# Patient Record
Sex: MALE | Race: WHITE | NOT HISPANIC OR LATINO | Employment: OTHER | URBAN - METROPOLITAN AREA
[De-identification: names, ages, dates, MRNs, and addresses within clinical notes are randomized per-mention and may not be internally consistent; named-entity substitution may affect disease eponyms.]

---

## 2017-05-01 ENCOUNTER — PRE VISIT (OUTPATIENT)
Dept: UROLOGY | Facility: CLINIC | Age: 37
End: 2017-05-01

## 2017-06-05 ENCOUNTER — PRE VISIT (OUTPATIENT)
Dept: UROLOGY | Facility: CLINIC | Age: 37
End: 2017-06-05

## 2017-06-12 ENCOUNTER — OFFICE VISIT (OUTPATIENT)
Dept: UROLOGY | Facility: CLINIC | Age: 37
End: 2017-06-12

## 2017-06-12 VITALS
BODY MASS INDEX: 34.27 KG/M2 | HEIGHT: 71 IN | WEIGHT: 244.8 LBS | DIASTOLIC BLOOD PRESSURE: 84 MMHG | SYSTOLIC BLOOD PRESSURE: 122 MMHG | HEART RATE: 63 BPM

## 2017-06-12 DIAGNOSIS — N31.9 NEUROGENIC BLADDER: Primary | ICD-10-CM

## 2017-06-12 DIAGNOSIS — G61.0 ACUTE INFECTIVE POLYNEURITIS (H): ICD-10-CM

## 2017-06-12 PROBLEM — L84 CORN: Status: ACTIVE | Noted: 2017-04-24

## 2017-06-12 PROBLEM — T17.308A CHOKING: Status: ACTIVE | Noted: 2017-02-02

## 2017-06-12 PROBLEM — R13.10 DYSPHAGIA: Status: ACTIVE | Noted: 2017-04-24

## 2017-06-12 RX ORDER — TRAMADOL HYDROCHLORIDE 50 MG/1
50-100 TABLET ORAL
COMMUNITY
Start: 2017-04-13 | End: 2018-10-31

## 2017-06-12 RX ORDER — TAMSULOSIN HYDROCHLORIDE 0.4 MG/1
0.4 CAPSULE ORAL DAILY
COMMUNITY
Start: 2016-08-04

## 2017-06-12 RX ORDER — FLUOXETINE 40 MG/1
40 CAPSULE ORAL DAILY
COMMUNITY
Start: 2017-02-02

## 2017-06-12 RX ORDER — ALPRAZOLAM 0.5 MG
0.5 TABLET ORAL DAILY PRN
COMMUNITY
Start: 2017-02-02

## 2017-06-12 RX ORDER — POLYETHYLENE GLYCOL 3350 17 G/17G
POWDER, FOR SOLUTION ORAL
COMMUNITY
Start: 2016-11-03

## 2017-06-12 RX ORDER — NAPROXEN SODIUM 220 MG
220 TABLET ORAL DAILY
COMMUNITY

## 2017-06-12 RX ORDER — NIFEDIPINE 30 MG/1
30 TABLET, EXTENDED RELEASE ORAL DAILY
COMMUNITY
Start: 2017-04-13

## 2017-06-12 ASSESSMENT — ENCOUNTER SYMPTOMS
TREMORS: 0
DEPRESSION: 1
SPEECH CHANGE: 0
INSOMNIA: 0
SEIZURES: 0
DIFFICULTY URINATING: 1
LOSS OF CONSCIOUSNESS: 0
NERVOUS/ANXIOUS: 1
MUSCLE WEAKNESS: 1
HEMATURIA: 0
MEMORY LOSS: 0
DYSURIA: 0
DISTURBANCES IN COORDINATION: 0
TINGLING: 1
STIFFNESS: 0
BACK PAIN: 0
PARALYSIS: 0
MYALGIAS: 1
PANIC: 0
NUMBNESS: 1
ARTHRALGIAS: 0
HEADACHES: 0
MUSCLE CRAMPS: 1
NECK PAIN: 0
WEAKNESS: 1
DECREASED CONCENTRATION: 0
FLANK PAIN: 0
DIZZINESS: 1
JOINT SWELLING: 0

## 2017-06-12 ASSESSMENT — PAIN SCALES - GENERAL: PAINLEVEL: NO PAIN (0)

## 2017-06-12 NOTE — LETTER
6/12/2017       RE: Alcides Velazquez  [address]  [place] [address]     Dear Colleague,    Thank you for referring your patient, Alcides Velazquez, to the [facility] UROLOGY AND INST FOR PROSTATE AND UROLOGIC CANCERS at [facility]. Please see a copy of my visit note below.      Name: Alcides Velazquez    MRN: [id number]   YOB: 1980                 Chief Complaint:   Urinary urgency/frequency          History of Present Illness:   Mr. 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 He is referred by Dr. Ismael Yan. He developed urinary retention during the acute phase of his illness and performed CIC for several months. He had return of some of his voiding function 6 months ago but now is experiencing urinary frequency and urgency, as well as occasional incomplete emptying.     He underwent UDS in 3/2017 with Dr. Mariah Giraldo in [place]; findings included first sensation at 141 cc, urge at 180 cc, and cystometric capacity at 185 cc. There was strong DO and voiding pressures reached a PDet of 75 cm H2O. There was no DSD observed. Qmax was 6.4 cc/s, Qave 1.6 cc/s, and his PVR was 105 cc. He has been managed with several anticholinergics in an effort to treat his urgency, including Detrol, Ditropan, and Vesicare. More recently (6 weeks ago), he underwent bladder Botox injections (do not know dose) but has not noted any improvement in his symptoms. Cystoscopy showed a small, non-obstructing prostate.     He denies urinary tract infections. He is not currently performing CIC. He is on tamsulosin (two 0.4 mg qhs tablets daily). He has never tried mirabegron. He was started on bethanechol briefly but stopped this due to side effects and poor effect.          Past Medical History:   GBS  HTN  Dysphagia  Anxiety         Past Surgical History:     Tendon surgery (thumb)         Social  "History:     Social History   Substance Use Topics     Smoking status: Never Smoker     Smokeless tobacco: Not on file     Alcohol use No            Family History:     Family History   Problem Relation Age of Onset     DIABETES Father      Hypertension No family hx of             Allergies:   No Known Allergies         Medications:     Current Outpatient Prescriptions   Medication Sig     ALPRAZolam (XANAX) 0.5 MG tablet Take 0.5 mg by mouth daily as needed     FLUoxetine (PROZAC) 40 MG capsule Take 40 mg by mouth daily     NIFEdipine ER osmotic (PROCARDIA XL) 30 MG 24 hr tablet Take 30 mg by mouth daily     polyethylene glycol (MIRALAX/GLYCOLAX) powder MIX 17 GRAMS (1 CAPFUL) IN LIQUID AND DRINK TWICE DAILY     tamsulosin (FLOMAX) 0.4 MG capsule Take 0.4 mg by mouth daily     traMADol (ULTRAM) 50 MG tablet Take  mg by mouth nightly as needed     melatonin 5 MG CAPS Take 5 mg by mouth daily     naproxen sodium (ANAPROX) 220 MG tablet Take 220 mg by mouth daily           Review of Systems:    ROS: 14 point ROS neg other than the symptoms noted above in the HPI and PMH.          Physical Exam:   B/P: 122/84, T: Data Unavailable, P: 63, R: Data Unavailable  Estimated body mass index is 34.14 kg/(m^2) as calculated from the following:    Height as of this encounter: 1.803 m (5' 11\").    Weight as of this encounter: 111 kg (244 lb 12.8 oz).  No acute distress, not in wheelchair  Unlabored breathing  Abdomen soft nt/nd  Circ, orthotopic meatus, no penile plaques  Bilateral testicles of normal consistency and firmness           Assessment and Plan:   36 year old gentleman recovering from GBS, with urinary urgency and frequency refractory to anticholinergics. Recently tried bladder Botox with minimal effect. There may be a component of bladder neck dysfunction to his symptoms, and options include TUIP versus adding mirabegron. Additionally we could try higher doses of anti-cholinergics versus higher doses of bladder " Botox.      Videourodynamics to evaluate for bladder neck dysfunction  He had high pressure, low flow voiding pattern during his last UDS. Goal with VUDS is to assess the voiding phase and to see if the bladder neck opens during the voiding phase, and where the contrast stops during voids. Lilliana should call me (Helio) after the test to review results and then I can call the patient so he can skip another clinic visit.     Seen and examined with Dr. Cadet.    Preston Thorpe MD, PGY-4  June 12, 2017     Delayed entry of note due to Epic downtime. 1539  =======================================================  As the attending surgeon I, Chito Cadet, interviewed and examined the patient. The plan was developed between me and the patient. My findings and plan are as stated by the resident.    Chito Cadet MD

## 2017-06-12 NOTE — NURSING NOTE
"Chief Complaint   Patient presents with     Consult     hypospadias       Blood pressure 122/84, pulse 63, height 1.803 m (5' 11\"), weight 111 kg (244 lb 12.8 oz). Body mass index is 34.14 kg/(m^2).    Patient Active Problem List   Diagnosis     Anxiety     Choking     Constipation     Corn     Difficulty in urination     Dysphagia     Essential hypertension     Guillain-[place] syndrome (H)     Class 1 obesity     Low back pain with sciatica     Snoring     Syncope     Syncope and collapse     Retention of urine     Vasovagal syncope       No Known Allergies    Current Outpatient Prescriptions   Medication Sig Dispense Refill     ALPRAZolam (XANAX) 0.5 MG tablet Take 0.5 mg by mouth daily as needed       FLUoxetine (PROZAC) 40 MG capsule Take 40 mg by mouth daily       NIFEdipine ER osmotic (PROCARDIA XL) 30 MG 24 hr tablet Take 30 mg by mouth daily       polyethylene glycol (MIRALAX/GLYCOLAX) powder MIX 17 GRAMS (1 CAPFUL) IN LIQUID AND DRINK TWICE DAILY       tamsulosin (FLOMAX) 0.4 MG capsule Take 0.4 mg by mouth daily       traMADol (ULTRAM) 50 MG tablet Take  mg by mouth nightly as needed       melatonin 5 MG CAPS Take 5 mg by mouth daily       naproxen sodium (ANAPROX) 220 MG tablet Take 220 mg by mouth daily         Social History   Substance Use Topics     Smoking status: Never Smoker     Smokeless tobacco: Not on file     Alcohol use No       Dayna Hopkins LPN  6/12/2017  2:28 PM    "

## 2017-06-12 NOTE — PROGRESS NOTES
Name: Alcides Velazquez    MRN: [id number]   YOB: 1980                 Chief Complaint:   Urinary urgency/frequency          History of Present Illness:   Mr. 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 He is referred by Dr. Ismael Yan. He developed urinary retention during the acute phase of his illness and performed CIC for several months. He had return of some of his voiding function 6 months ago but now is experiencing urinary frequency and urgency, as well as occasional incomplete emptying.     He underwent UDS in 3/2017 with Dr. Mariah Giraldo in [place]; findings included first sensation at 141 cc, urge at 180 cc, and cystometric capacity at 185 cc. There was strong DO and voiding pressures reached a PDet of 75 cm H2O. There was no DSD observed. Qmax was 6.4 cc/s, Qave 1.6 cc/s, and his PVR was 105 cc. He has been managed with several anticholinergics in an effort to treat his urgency, including Detrol, Ditropan, and Vesicare. More recently (6 weeks ago), he underwent bladder Botox injections (do not know dose) but has not noted any improvement in his symptoms. Cystoscopy showed a small, non-obstructing prostate.     He denies urinary tract infections. He is not currently performing CIC. He is on tamsulosin (two 0.4 mg qhs tablets daily). He has never tried mirabegron. He was started on bethanechol briefly but stopped this due to side effects and poor effect.          Past Medical History:   GBS  HTN  Dysphagia  Anxiety         Past Surgical History:     Tendon surgery (thumb)         Social History:     Social History   Substance Use Topics     Smoking status: Never Smoker     Smokeless tobacco: Not on file     Alcohol use No            Family History:     Family History   Problem Relation Age of Onset     DIABETES Father      Hypertension No family hx of             Allergies:   No Known  "Allergies         Medications:     Current Outpatient Prescriptions   Medication Sig     ALPRAZolam (XANAX) 0.5 MG tablet Take 0.5 mg by mouth daily as needed     FLUoxetine (PROZAC) 40 MG capsule Take 40 mg by mouth daily     NIFEdipine ER osmotic (PROCARDIA XL) 30 MG 24 hr tablet Take 30 mg by mouth daily     polyethylene glycol (MIRALAX/GLYCOLAX) powder MIX 17 GRAMS (1 CAPFUL) IN LIQUID AND DRINK TWICE DAILY     tamsulosin (FLOMAX) 0.4 MG capsule Take 0.4 mg by mouth daily     traMADol (ULTRAM) 50 MG tablet Take  mg by mouth nightly as needed     melatonin 5 MG CAPS Take 5 mg by mouth daily     naproxen sodium (ANAPROX) 220 MG tablet Take 220 mg by mouth daily           Review of Systems:    ROS: 14 point ROS neg other than the symptoms noted above in the HPI and PMH.          Physical Exam:   B/P: 122/84, T: Data Unavailable, P: 63, R: Data Unavailable  Estimated body mass index is 34.14 kg/(m^2) as calculated from the following:    Height as of this encounter: 1.803 m (5' 11\").    Weight as of this encounter: 111 kg (244 lb 12.8 oz).  No acute distress, not in wheelchair  Unlabored breathing  Abdomen soft nt/nd  Circ, orthotopic meatus, no penile plaques  Bilateral testicles of normal consistency and firmness           Assessment and Plan:   36 year old gentleman recovering from GBS, with urinary urgency and frequency refractory to anticholinergics. Recently tried bladder Botox with minimal effect. There may be a component of bladder neck dysfunction to his symptoms, and options include TUIP versus adding mirabegron. Additionally we could try higher doses of anti-cholinergics versus higher doses of bladder Botox.      Videourodynamics to evaluate for bladder neck dysfunction  He had high pressure, low flow voiding pattern during his last UDS. Goal with VUDS is to assess the voiding phase and to see if the bladder neck opens during the voiding phase, and where the contrast stops during voids. Lilliana should " call me (Helio) after the test to review results and then I can call the patient so he can skip another clinic visit.     Seen and examined with Dr. Cadet.    Preston Thorpe MD, PGY-4  June 12, 2017     Delayed entry of note due to Epic downtime. 1539  =======================================================  As the attending surgeon I, Chito Cadet, interviewed and examined the patient. The plan was developed between me and the patient. My findings and plan are as stated by the resident.    Chito Cadet MD

## 2017-06-12 NOTE — MR AVS SNAPSHOT
After Visit Summary   2017    Alcides Velazquez    MRN: [id number]           Patient Information     Date Of Birth          1980        Visit Information        Provider Department      2017 10:30 AM Chito Cadet MD [facility] for Prostate and Urologic Cancers        Today's Diagnoses     Neurogenic bladder    -  1    Acute infective polyneuritis (H)           Follow-ups after your visit        Your next 10 appointments already scheduled     2017  9:20 AM CDT   (Arrive by 9:05 AM)   New Neuropathy with Sukhi Quintana MD   [facility] ([facility])    [address] [place] [address]  [address]rd [facility] [address] [phone]              Who to contact     Please call your clinic at [phone] to:    Ask questions about your health    Make or cancel appointments    Discuss your medicines    Learn about your test results    Speak to your doctor   If you have compliments or concerns about an experience at your clinic, or if you wish to file a complaint, please contact [facility] Patient Relations at [phone] or email us at [email or web address]         Additional Information About Your Visit        MyChart Information     [employer] is an electronic gateway that provides easy, online access to your medical records. With [employer], you can request a clinic appointment, read your test results, renew a prescription or communicate with your care team.     To sign up for [employer] visit the website at [email or web address]   You will be asked to enter the access code listed below, as well as some personal information. Please follow the directions to create your username and password.     Your access code is: [email or web address]  Expires: 2017  6:31 AM     Your access code will  in 90 days. If you need help or a new code, please contact your [facility]  "Clinic or call [phone] for assistance.        Care EveryWhere ID     This is your Care EveryWhere ID. This could be used by other organizations to access your [place] medical records  [id number]        Your Vitals Were     Pulse Height BMI (Body Mass Index)             63 1.803 m (5' 11\") 34.14 kg/m2          Blood Pressure from Last 3 Encounters:   06/12/17 122/84    Weight from Last 3 Encounters:   06/12/17 111 kg (244 lb 12.8 oz)              Today, you had the following     No orders found for display       Primary Care Provider Office Phone # Fax #    Ismael Yan [phone]       [facility] [address] [address]        Thank you!     Thank you for choosing [facility] UROLOGY AND [facility] FOR PROSTATE AND UROLOGIC CANCERS  for your care. Our goal is always to provide you with excellent care. Hearing back from our patients is one way we can continue to improve our services. Please take a few minutes to complete the written survey that you may receive in the mail after your visit with us. Thank you!             Your Updated Medication List - Protect others around you: Learn how to safely use, store and throw away your medicines at www.disposemymeds.org.          This list is accurate as of: 6/12/17 11:59 PM.  Always use your most recent med list.                   Brand Name Dispense Instructions for use    ALPRAZolam 0.5 MG tablet    XANAX     Take 0.5 mg by mouth daily as needed       FLUoxetine 40 MG capsule    PROzac     Take 40 mg by mouth daily       melatonin 5 MG Caps      Take 5 mg by mouth daily       naproxen sodium 220 MG tablet    ANAPROX     Take 220 mg by mouth daily       NIFEdipine ER osmotic 30 MG 24 hr tablet    PROCARDIA XL     Take 30 mg by mouth daily       polyethylene glycol powder    MIRALAX/GLYCOLAX     MIX 17 GRAMS (1 CAPFUL) IN LIQUID AND DRINK TWICE DAILY       tamsulosin 0.4 MG capsule    FLOMAX     Take 0.4 mg by mouth daily       " traMADol 50 MG tablet    ULTRAM     Take  mg by mouth nightly as needed

## 2017-06-13 PROBLEM — G61.0 ACUTE INFECTIVE POLYNEURITIS (H): Status: ACTIVE | Noted: 2017-06-13

## 2017-06-13 PROBLEM — N31.9 NEUROGENIC BLADDER: Status: ACTIVE | Noted: 2017-06-13

## 2017-06-19 ENCOUNTER — PRE VISIT (OUTPATIENT)
Dept: UROLOGY | Facility: CLINIC | Age: 37
End: 2017-06-19

## 2017-06-29 ENCOUNTER — OFFICE VISIT (OUTPATIENT)
Dept: UROLOGY | Facility: CLINIC | Age: 37
End: 2017-06-29

## 2017-06-29 VITALS
BODY MASS INDEX: 34.13 KG/M2 | HEART RATE: 83 BPM | WEIGHT: 243.8 LBS | DIASTOLIC BLOOD PRESSURE: 79 MMHG | SYSTOLIC BLOOD PRESSURE: 127 MMHG | HEIGHT: 71 IN

## 2017-06-29 DIAGNOSIS — N32.0 BLADDER NECK OBSTRUCTION: Primary | ICD-10-CM

## 2017-06-29 DIAGNOSIS — R39.15 URINARY URGENCY: ICD-10-CM

## 2017-06-29 DIAGNOSIS — R35.0 URINARY FREQUENCY: ICD-10-CM

## 2017-06-29 DIAGNOSIS — R39.11 URINARY HESITANCY: ICD-10-CM

## 2017-06-29 LAB
APPEARANCE UR: CLEAR
BILIRUB UR QL: NORMAL
COLOR UR: YELLOW
GLUCOSE URINE: NORMAL MG/DL
HGB UR QL: NORMAL
KETONES UR QL: NORMAL MG/DL
LEUKOCYTE ESTERASE URINE: NORMAL
NITRITE UR QL STRIP: NORMAL
PH UR STRIP: 5 PH (ref 5–7)
PROTEIN ALBUMIN URINE: NORMAL MG/DL
SOURCE: NORMAL
SP GR UR STRIP: 1.01 (ref 1–1.03)
UROBILINOGEN UR QL STRIP: 0.2 EU/DL (ref 0.2–1)

## 2017-06-29 ASSESSMENT — PAIN SCALES - GENERAL: PAINLEVEL: NO PAIN (0)

## 2017-06-29 NOTE — PROGRESS NOTES
PREPROCEDURE DIAGNOSES:    1. Urinary frequency, urgency  2. Hesitancy and incomplete emptying  3. History of Guillain [place], improving    POSTPROCEDURE DIAGNOSES:  1. Bladder neck dysfunction  2. Urinary frequency, urgency  3. Hesitancy and incomplete emptying  4. History of Guillain [place], ipmroving    PROCEDURE:    1. Uroflowmetry.  2. Sterile urethral catheterization for measurement of postvoid residual urine volume.  3. Voiding cystometrogram  4. Fluoroscopic imaging of the bladder during urodynamics, at least 3 views.    5. Interpretation of flouroscopic imaging.      INDICATIONS FOR PROCEDURE:  Mr. 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 He developed urinary retention during the acute phase of his illness and performed CIC for several months. He had return of some of his voiding function 6 months ago but now is experiencing urinary frequency and urgency, as well as incomplete emptying.     He underwent UDS in 3/2017 in [place]. Findings include: first sensation at 141 cc, urge at 180 cc, and cystometric capacity at 185 cc. There was strong DO and voiding pressures reached PDet of 75 cm H2O. No DSD. Qmax 6.4 ml/s, Qave 1.6 ml/s,  cc. He has been managed with several anticholinergics in an effort to treat his urgency including Detrol, Ditropan, and Vesicare. More recently (2-3 months ago), he underwent bladder Botox injections (unknown dose) but has not noted improvement in his symptoms. Cystoscopy showed a small, non-obstructing prostate. Denies UTI's. Not currently performing CIC. On Flomax 0.8 mg daily. On Bethanechol briefly but stopped due to side effects and poor effect.     Video urodynamic assessment is requested today by Dr. Cadet to better characterize Mr. Alcides Velazquez's voiding dysfunction and evaluate for possible bladder neck dysfunction.       VOIDING DIARY:  Did not complete.  Per history:  Voids every 1 hour during the day, nocturia q1.5-2 hours.  Also reports urinary hesitancy and sense of incomplete emptying.   Episodes of incontinence: rare, but occasional urge incontinence.  Incontinence associated with: strong urge.    DESCRIPTION OF PROCEDURE:  Risks, benefits, and alternatives to urodynamics were discussed with the patient and he wished to proceed.  Urodynamics are planned to better assess the primary etiology for Mr. Velazquez's urologic dysfunction.  The patient is not currently taking any anticholinergic medications.  After informed consent was obtained, the patient was taken to the procedure room where uroflowmetry was performed. Findings below.     UROFLOWMETRY:  Voided volume: 115 mL.  Maximum flow rate: 6.2 mL/sec.  Average flow rate: 2.1 mL/sec.  Postvoid residual by catheter: 225 mL.  Character of the curve: obstructed.  Pretest urine dipstick was negative for leukocytes and nitrites.    Next, we attempted to place a 7F double-lumen urodynamics catheter but were unsuccessful, even after multiple attempts by nursing staff. I then attempted to place a Coude 7F double-lumen urodynamics catheter, but still met resistance at what felt to be the bladder neck and I was unable to pass this into the bladder. Staff urologist, Dr. Harrell, then attempted to place catheter. She was able to successfully place a regular 8 Fr Coude catheter into the bladder with return of urine. The urodynamics catheter was then slid alongside the 8Fr catheter already in the bladder, but this would not enter the bladder. The catheter was felt in the proximal urethra and appeared to be coiling. Multiple attempts were made by Dr. Harrell but she was ultimately unable to place the urodynamics catheter.     I then attempted to call Dr. Cadet for other recommendations but he was unavailable.  As an alternative to UDS, the patient was scheduled for RUG/VCUG in radiology to further assess the bladder neck.  Dr. Cadet's  fellow, Dr. Montez Parekh, then returned my page and recommended we simply fill the patient's bladder with contrast via Lares catheter and take voiding images in the UDS suite to assess the bladder neck fluoroscopically without need for radiology appointment.  We therefore cancelled his RUG/VCUG appointment and proceeded with the following:    FLUOROSCOPIC IMAGING OF THE BLADDER  A 14 Fr Alres catheter was inserted into the patient's bladder with some difficulty. The balloon was filled with 10 cc of saline.  His bladder was then filled with 200 mL of Cystografin via syringe instillation. He expressed no sensation of fullness or urgency at this volume.   We therefore instilled an additional 210 mL of normal saline and the patient expressed strong urgency with a desire to void imminently.  Pre-void images were taken which revealed a smooth walled bladder without diverticulae or cellules. No vesicoureteral reflux was observed.  The catheter balloon was then deflated and the Lares catheter was removed with difficulty.  The patient then voided into uroflow and voiding images were recorded which revealed a narrow bladder neck, but no other obvious obstruction.   (Voiding data below).  The patient was then catheterized for a final time with a 14 Fr straight catheter to remove residual volume of 164 mL.    DURING THE VOIDING PHASE:  Voided volume: 246 mL.  Maximum flow rate: 7.7 mL/sec.  Average flow rate: 2.8 mL/sec.  Postvoid Residual: 164 mL.  Character of voiding curve: obstructed.    ASSESSMENT/PLAN:  Mr. Alcides Velazquez is a pleasant 36 year old male recovering from GBS with urinary urgency and frequency refractory to anticholinergics and bladder Botox (unknown dose) who demonstrated the following findings today on fluoroscopic imaging of the bladder:    -Narrow bladder neck during voiding  -Normal bladder capacity  -Visible Valsalva voiding (patient straining hard and engaging abdominal muscles)  -Incomplete  emptying    Unfortunately, due to being unable to place double-lumen urodynamics catheter, we were unable to assess detrusor pressures during filling/voiding. However, previous UDS in [place] in 3/2017 demonstrated a high pressure (PDet of 75 cm H2O)/low flow (Qmax 6.4 mL/s) scenario with no DSD.   -Given today's findings, suspect bladder neck dysfunction is the most likely etiology for his urinary symptoms. Would therefore be reasonable to consider TUIP as mentioned in previous chart notes.     I will send a message to Dr. Cadet to further discuss today's study results and make plans for how best to proceed. Dr. Cadet then plans to call patient so he can hoefully skip another clinic visit per last office chart note.       - A single Cipro antibiotic was provided for UTI prophylaxis following completion of today's study per department protocol.  The risk of UTI with VUDS is low at ~2.5-3%.      Thank you for allowing me to participate in the care of Mr. Alcides Velazquez and please don't hesitate to contact me with any questions or concerns.      Lilliana Avalos PA-C  Urology Physician Assistant

## 2017-06-29 NOTE — Clinical Note
Dr. Cadet - here is the UDS report for this patient. I will send you a separate staff message with some details (I had also left you a voice mail on your cell phone earlier in the day as we had some catheter issues). Thanks, Lilliana Avalos PA-C

## 2017-06-29 NOTE — MR AVS SNAPSHOT
After Visit Summary   2017    Alcides Velazquez    MRN: [id number]           Patient Information     Date Of Birth          1980        Visit Information        Provider Department      2017 11:30 AM Lilliana Avalos PA-C [facility]rology and [facility] for Prostate and Urologic Cancers        Today's Diagnoses     Bladder neck obstruction    -  1    Urinary urgency        Urinary frequency        Urinary hesitancy           Follow-ups after your visit        Your next 10 appointments already scheduled     2017  9:20 AM CDT   (Arrive by 9:05 AM)   New Neuropathy with Sukhi Quintana MD   [facility] ([facility])    [address] [place] [address] [phone]              Who to contact     Please call your clinic at [phone] to:    Ask questions about your health    Make or cancel appointments    Discuss your medicines    Learn about your test results    Speak to your doctor   If you have compliments or concerns about an experience at your clinic, or if you wish to file a complaint, please contact [facility] Patient Relations at [phone] or email us at [email or web address]         Additional Information About Your Visit        MyChart Information     Intervolvehart is an electronic gateway that provides easy, online access to your medical records. With [employer], you can request a clinic appointment, read your test results, renew a prescription or communicate with your care team.     To sign up for ExpertFlyert visit the website at [email or web address]   You will be asked to enter the access code listed below, as well as some personal information. Please follow the directions to create your username and password.     Your access code is: [email or web address]  Expires: 2017  6:31 AM     Your access code will  in 90 days. If you need help or a new code, please contact your  "[facility] or call [phone] for assistance.        Care EveryWhere ID     This is your Care EveryWhere ID. This could be used by other organizations to access your [place] medical records  [id number]        Your Vitals Were     Pulse Height BMI (Body Mass Index)             83 1.803 m (5' 11\") 34 kg/m2          Blood Pressure from Last 3 Encounters:   06/29/17 127/79   06/12/17 122/84    Weight from Last 3 Encounters:   06/29/17 110.6 kg (243 lb 12.8 oz)   06/12/17 111 kg (244 lb 12.8 oz)              We Performed the Following     COMPLEX UROFLOWMETRY     HC CONTRAST ISOVUE 370 MG/ML, PER ML     Urinalysis chemical screen POCT     XR Cystogram Voiding        Primary Care Provider Office Phone # Fax #    Ismael Yan [phone]       [facility] [address] [address]        Equal Access to Services     KRYSTAL GOULD : Hadii aad ku hadasho Soomaali, waaxda luqadaha, qaybta kaalmada adeegyada, waxay charlesin haycatracho bautista . So [facility] [phone].    ATENCIÓN: Si rayola espjennifer, tiene a cortes disposición servicios gratuitos de asistencia lingüística. Llame al [phone].    We comply with applicable federal civil rights laws and [place] laws. We do not discriminate on the basis of race, color, national origin, age, disability sex, sexual orientation or gender identity.            Thank you!     Thank you for choosing [facility] UROLOGY AND [facility] FOR PROSTATE AND UROLOGIC CANCERS  for your care. Our goal is always to provide you with excellent care. Hearing back from our patients is one way we can continue to improve our services. Please take a few minutes to complete the written survey that you may receive in the mail after your visit with us. Thank you!             Your Updated Medication List - Protect others around you: Learn how to safely use, store and throw away your medicines at www.disposemymeds.org.          This list is " accurate as of: 6/29/17  5:32 PM.  Always use your most recent med list.                   Brand Name Dispense Instructions for use Diagnosis    ALPRAZolam 0.5 MG tablet    XANAX     Take 0.5 mg by mouth daily as needed        FLUoxetine 40 MG capsule    PROzac     Take 40 mg by mouth daily        melatonin 5 MG Caps      Take 5 mg by mouth daily        naproxen sodium 220 MG tablet    ANAPROX     Take 220 mg by mouth daily        NIFEdipine ER osmotic 30 MG 24 hr tablet    PROCARDIA XL     Take 30 mg by mouth daily        polyethylene glycol powder    MIRALAX/GLYCOLAX     MIX 17 GRAMS (1 CAPFUL) IN LIQUID AND DRINK TWICE DAILY        tamsulosin 0.4 MG capsule    FLOMAX     Take 0.4 mg by mouth daily        traMADol 50 MG tablet    ULTRAM     Take  mg by mouth nightly as needed

## 2017-06-29 NOTE — LETTER
6/29/2017       RE: Alcides Velazquez  [address]  [place] [address]     Dear Colleague,    Thank you for referring your patient, Alcides Velazquez, to the [facility] UROLOGY AND INST FOR PROSTATE AND UROLOGIC CANCERS at [facility]. Please see a copy of my visit note below.    PREPROCEDURE DIAGNOSES:    1. Urinary frequency, urgency  2. Hesitancy and incomplete emptying  3. History of Guillain Prather, improving    POSTPROCEDURE DIAGNOSES:  1. Bladder neck dysfunction  2. Urinary frequency, urgency  3. Hesitancy and incomplete emptying  4. History of Guillain Prather, ipmroving    PROCEDURE:    1. Uroflowmetry.  2. Sterile urethral catheterization for measurement of postvoid residual urine volume.  3. Voiding cystometrogram  4. Fluoroscopic imaging of the bladder during urodynamics, at least 3 views.    5. Interpretation of flouroscopic imaging.      INDICATIONS FOR PROCEDURE:  Mr. Alcides Velazquez is a pleasant 36 year old male with a history of Guillain Prather (diagnosed 18 months ago) after influenza vaccination, now improving. He developed urinary retention during the acute phase of his illness and performed CIC for several months. He had return of some of his voiding function 6 months ago but now is experiencing urinary frequency and urgency, as well as incomplete emptying.     He underwent UDS in 3/2017 in [place]. Findings include: first sensation at 141 cc, urge at 180 cc, and cystometric capacity at 185 cc. There was strong DO and voiding pressures reached PDet of 75 cm H2O. No DSD. Qmax 6.4 ml/s, Qave 1.6 ml/s,  cc. He has been managed with several anticholinergics in an effort to treat his urgency including Detrol, Ditropan, and Vesicare. More recently (2-3 months ago), he underwent bladder Botox injections (unknown dose) but has not noted improvement in his symptoms. Cystoscopy showed a small, non-obstructing prostate. Denies UTI's. Not currently performing CIC. On Flomax  0.8 mg daily. On Bethanechol briefly but stopped due to side effects and poor effect.     Video urodynamic assessment is requested today by Dr. Cadet to better characterize Mr. Alcides Velazquez's voiding dysfunction and evaluate for possible bladder neck dysfunction.       VOIDING DIARY:  Did not complete. Per history:  Voids every 1 hour during the day, nocturia q1.5-2 hours.  Also reports urinary hesitancy and sense of incomplete emptying.   Episodes of incontinence: rare, but occasional urge incontinence.  Incontinence associated with: strong urge.    DESCRIPTION OF PROCEDURE:  Risks, benefits, and alternatives to urodynamics were discussed with the patient and he wished to proceed.  Urodynamics are planned to better assess the primary etiology for Mr. Velazquez's urologic dysfunction.  The patient is not currently taking any anticholinergic medications.  After informed consent was obtained, the patient was taken to the procedure room where uroflowmetry was performed. Findings below.     UROFLOWMETRY:  Voided volume: 115 mL.  Maximum flow rate: 6.2 mL/sec.  Average flow rate: 2.1 mL/sec.  Postvoid residual by catheter: 225 mL.  Character of the curve: obstructed.  Pretest urine dipstick was negative for leukocytes and nitrites.    Next, we attempted to place a 7F double-lumen urodynamics catheter but were unsuccessful, even after multiple attempts by nursing staff. I then attempted to place a Coude 7F double-lumen urodynamics catheter, but still met resistance at what felt to be the bladder neck and I was unable to pass this into the bladder. Staff urologist, Dr. Harrell, then attempted to place catheter. She was able to successfully place a regular 8 Fr Coude catheter into the bladder with return of urine. The urodynamics catheter was then slid alongside the 8Fr catheter already in the bladder, but this would not enter the bladder. The catheter was felt in the proximal urethra and appeared to be coiling. Multiple attempts  were made by Dr. Harrell but she was ultimately unable to place the urodynamics catheter.     I then attempted to call Dr. Cadet for other recommendations but he was unavailable.  As an alternative to UDS, the patient was scheduled for RUG/VCUG in radiology to further assess the bladder neck.  Dr. Cadet's fellow, Dr. Montez Parekh, then returned my page and recommended we simply fill the patient's bladder with contrast via Lares catheter and take voiding images in the UDS suite to assess the bladder neck fluoroscopically without need for radiology appointment.  We therefore cancelled his RUG/VCUG appointment and proceeded with the following:    FLUOROSCOPIC IMAGING OF THE BLADDER  A 14 Fr Lares catheter was inserted into the patient's bladder with some difficulty. The balloon was filled with 10 cc of saline.  His bladder was then filled with 200 mL of Cystografin via syringe instillation. He expressed no sensation of fullness or urgency at this volume.   We therefore instilled an additional 210 mL of normal saline and the patient expressed strong urgency with a desire to void imminently.  Pre-void images were taken which revealed a smooth walled bladder without diverticulae or cellules. No vesicoureteral reflux was observed.  The catheter balloon was then deflated and the Lares catheter was removed with difficulty.  The patient then voided into uroflow and voiding images were recorded which revealed a narrow bladder neck, but no other obvious obstruction.   (Voiding data below).  The patient was then catheterized for a final time with a 14 Fr straight catheter to remove residual volume of 164 mL.    DURING THE VOIDING PHASE:  Voided volume: 246 mL.  Maximum flow rate: 7.7 mL/sec.  Average flow rate: 2.8 mL/sec.  Postvoid Residual: 164 mL.  Character of voiding curve: obstructed.    ASSESSMENT/PLAN:  Mr. Alcides Velazquez is a pleasant 36 year old male recovering from GBS with urinary urgency and frequency refractory to  anticholinergics and bladder Botox (unknown dose) who demonstrated the following findings today on fluoroscopic imaging of the bladder:    -Narrow bladder neck during voiding  -Normal bladder capacity  -Visible Valsalva voiding (patient straining hard and engaging abdominal muscles)  -Incomplete emptying    Unfortunately, due to being unable to place double-lumen urodynamics catheter, we were unable to assess detrusor pressures during filling/voiding. However, previous UDS in [place] in 3/2017 demonstrated a high pressure (PDet of 75 cm H2O)/low flow (Qmax 6.4 mL/s) scenario with no DSD.   -Given today's findings, suspect bladder neck dysfunction is the most likely etiology for his urinary symptoms. Would therefore be reasonable to consider TUIP as mentioned in previous chart notes.     I will send a message to Dr. Cadet to further discuss today's study results and make plans for how best to proceed. Dr. Cadet then plans to call patient so he can hoefully skip another clinic visit per last office chart note.       - A single Cipro antibiotic was provided for UTI prophylaxis following completion of today's study per department protocol.  The risk of UTI with VUDS is low at ~2.5-3%.      Thank you for allowing me to participate in the care of . Alcides Velazquez and please don't hesitate to contact me with any questions or concerns.      Lilliana Avalos PA-C  Urology Physician Assistant

## 2017-07-06 ENCOUNTER — PRE VISIT (OUTPATIENT)
Dept: NEUROLOGY | Facility: CLINIC | Age: 37
End: 2017-07-06

## 2017-07-06 NOTE — TELEPHONE ENCOUNTER
1.  Date/reason for appt: 7/19/17 Guillian Monon, Weakness,  2.  Referring provider: KELL MAGDALENO   3.  Call to patient (Yes / No - short description): No, referral   4.  Previous care at / records requested from: [facility]  5.  Other: Records received from [facility].   Included  Office notes: 4/24/17, 4/5/16, 2/2/17, 2/26/16, 2/8/16, 10/19/16, 4/5/16   [facility]: 2/16/17   [facility]: 12/1/16, 4/19/16, 1/26/16-1/31/16   Other: labs     Missing/needed records: [facility]- images, CSF study, EMG

## 2017-07-13 ENCOUNTER — TELEPHONE (OUTPATIENT)
Dept: UROLOGY | Facility: CLINIC | Age: 37
End: 2017-07-13

## 2017-07-13 DIAGNOSIS — N32.0 BLADDER NECK OBSTRUCTION: Primary | ICD-10-CM

## 2017-07-13 RX ORDER — CEFAZOLIN SODIUM 1 G/3ML
1 INJECTION, POWDER, FOR SOLUTION INTRAMUSCULAR; INTRAVENOUS SEE ADMIN INSTRUCTIONS
Status: CANCELLED | OUTPATIENT
Start: 2017-07-13

## 2017-07-13 NOTE — TELEPHONE ENCOUNTER
Left the patient a VM regarding his surgery date and time. Surgery is scheduled for 08/11/17 at 12:50pm with a 11:30am check in. Post scheduled for 09/11/17 at 10:40am. Surgery packet is being mailed today. Asked that he call us back to confirm he received this message.

## 2017-07-13 NOTE — TELEPHONE ENCOUNTER
Pts wife calling to scheduled surgery. Message and voicemail sent to Dr. Cadet to place orders.   Adriana Crowell RN

## 2017-07-19 ENCOUNTER — OFFICE VISIT (OUTPATIENT)
Dept: NEUROLOGY | Facility: CLINIC | Age: 37
End: 2017-07-19

## 2017-07-19 VITALS — HEART RATE: 68 BPM | SYSTOLIC BLOOD PRESSURE: 132 MMHG | DIASTOLIC BLOOD PRESSURE: 80 MMHG | HEIGHT: 71 IN

## 2017-07-19 DIAGNOSIS — G83.4 CAUDA EQUINA SYNDROME (H): Primary | ICD-10-CM

## 2017-07-19 ASSESSMENT — ENCOUNTER SYMPTOMS
INSOMNIA: 1
DIZZINESS: 0
TINGLING: 1
MUSCLE WEAKNESS: 1
ARTHRALGIAS: 1
WEAKNESS: 1
NERVOUS/ANXIOUS: 1
NECK PAIN: 0
SEIZURES: 0
DISTURBANCES IN COORDINATION: 0
TREMORS: 0
DEPRESSION: 1
FLANK PAIN: 0
JOINT SWELLING: 0
MEMORY LOSS: 0
NUMBNESS: 1
BACK PAIN: 1
DIFFICULTY URINATING: 1
MYALGIAS: 1
SPEECH CHANGE: 0
PARALYSIS: 0
HEMATURIA: 0
LOSS OF CONSCIOUSNESS: 0
PANIC: 0
HEADACHES: 0
DYSURIA: 1
MUSCLE CRAMPS: 0
STIFFNESS: 1
DECREASED CONCENTRATION: 0

## 2017-07-19 ASSESSMENT — PAIN SCALES - GENERAL: PAINLEVEL: MILD PAIN (2)

## 2017-07-19 NOTE — MR AVS SNAPSHOT
After Visit Summary   7/19/2017    Alcides Velazquez    MRN: [id number]           Patient Information     Date Of Birth          1980        Visit Information        Provider Department      7/19/2017 9:20 AM Sukhi Quintana MD [facility]        Care Instructions    If you have questions or concerns following today's appointment, please contact   Lyn Larson at [phone].    For more urgent concerns, contact the neurology department triage line at [phone] option 3.    Follow up with your PCP. They can consider Lyrica or Gabapentin for nerve pain if your current medication does not work. Continue to follow with neurology.    We think you had cauda equina syndrome due either to spinal cord stroke or inflammation. Recovery/cure is slow, and patients can have symptoms for a while.          Follow-ups after your visit        Your next 10 appointments already scheduled     Aug 11, 2017   Procedure with Chito Cadet MD   [facility] ([facility] [place])    [address] [place] [address] [phone]           Located in the Clinics and Surgery Center at [address] [place] [address].   parking is very convenient and highly recommended.  is a $6 flat rate fee.  Both  and self parkers should enter the main arrival plaza from [facility]; parking attendants will direct you based on your parking preference.            Sep 11, 2017 10:30 AM CDT   (Arrive by 10:15 AM)   Post-Op with Lilliana Avalos PA-C   [facility] and Inst for Prostate and Urologic Cancers ([facility] [place])    [address] [place], [place] [address] [phone]              Who to contact     Please call your clinic at [phone] to:    Ask questions about your health    Make or cancel appointments    Discuss your medicines    Learn about your test  "results    Speak to your doctor   If you have compliments or concerns about an experience at your clinic, or if you wish to file a complaint, please contact [facility] Patient Relations at [phone] or email us at [email or web address]         Additional Information About Your Visit        ChoiceMaphart Information     Happifyt is an electronic gateway that provides easy, online access to your medical records. With [employer], you can request a clinic appointment, read your test results, renew a prescription or communicate with your care team.     To sign up for DishOpinion visit the website at [email or web address]   You will be asked to enter the access code listed below, as well as some personal information. Please follow the directions to create your username and password.     Your access code is: [email or web address]  Expires: 2017  6:31 AM     Your access code will  in 90 days. If you need help or a new code, please contact your [facility] or call [phone] for assistance.        Care EveryWhere ID     This is your Care EveryWhere ID. This could be used by other organizations to access your [place] medical records  [id number]        Your Vitals Were     Pulse Height                68 1.803 m (5' 11\")           Blood Pressure from Last 3 Encounters:   17 132/80   17 127/79   17 122/84    Weight from Last 3 Encounters:   17 110.6 kg (243 lb 12.8 oz)   17 111 kg (244 lb 12.8 oz)              Today, you had the following     No orders found for display       Primary Care Provider Office Phone # Fax #    Ismael Yan [phone]       [facility] [address] [address]        Equal Access to Services     KRYSTAL GOULD : Mateo Keene, daniel brock, selwyn woodruff. So [facility] [phone].    ATENCIÓN: Si " brigitte ybarra, tiene a cortes disposición servicios gratuitos de asistencia lingüística. Zeke schmidt [phone].    We comply with applicable federal civil rights laws and [place] laws. We do not discriminate on the basis of race, color, national origin, age, disability sex, sexual orientation or gender identity.            Thank you!     Thank you for choosing [facility] NEUROLOGY  for your care. Our goal is always to provide you with excellent care. Hearing back from our patients is one way we can continue to improve our services. Please take a few minutes to complete the written survey that you may receive in the mail after your visit with us. Thank you!             Your Updated Medication List - Protect others around you: Learn how to safely use, store and throw away your medicines at www.disposemymeds.org.          This list is accurate as of: 7/19/17  9:45 AM.  Always use your most recent med list.                   Brand Name Dispense Instructions for use Diagnosis    ALPRAZolam 0.5 MG tablet    XANAX     Take 0.5 mg by mouth daily as needed        FLUoxetine 40 MG capsule    PROzac     Take 40 mg by mouth daily        melatonin 5 MG Caps      Take 5 mg by mouth daily        naproxen sodium 220 MG tablet    ANAPROX     Take 220 mg by mouth daily        NIFEdipine ER osmotic 30 MG 24 hr tablet    PROCARDIA XL     Take 30 mg by mouth daily        polyethylene glycol powder    MIRALAX/GLYCOLAX     MIX 17 GRAMS (1 CAPFUL) IN LIQUID AND DRINK TWICE DAILY        tamsulosin 0.4 MG capsule    FLOMAX     Take 0.4 mg by mouth daily        traMADol 50 MG tablet    ULTRAM     Take  mg by mouth nightly as needed

## 2017-07-19 NOTE — PATIENT INSTRUCTIONS
If you have questions or concerns following today's appointment, please contact   Lyn Larson at [phone].    For more urgent concerns, contact the neurology department triage line at [phone] option 3.    Follow up with your PCP. They can consider Lyrica or Gabapentin for nerve pain if your current medication does not work. Continue to follow with neurology.    We think you had cauda equina syndrome due either to spinal cord stroke or inflammation. Recovery/cure is slow, and patients can have symptoms for a while.

## 2017-07-19 NOTE — PROGRESS NOTES
Ismael Yan MD  [id number]  [place] [address]    Carlos Farias MD  [facility]  [address], [place]   [address]    Dear Tahnh Yan and Jarrett,     I had the pleasure to see Mr Velazquez at the [facility] today for a second opinion on his diagnosis. 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 He had received influenza vaccination a few weeks prior to onset of his symptoms. On 1/25/2016 he slided off his [place] in his farm (he was shoveling snow) when he experienced acute onset of numbness from the perineal region to the thighs, lower legs and feet bilaterally and inability to stand. The symptoms, according to his wife and him, evolved over at most 30 minutes to 1 hour. He sought emergent medical care in [facility]. He did NOT experience back pain at that time. He was admitted in [facility] and underwent MRI of brain, cervical, thoracic and lumbar spine with contrast. No findings to explain his presentation were seen. He also had a CSF examination on 1/26/2016 which was normal. He had an EMG on 1/28/2016 by Dr Farias. Report is attached in subsequent section.   He was treated for presumed Guillain [place] syndrome with IVIG for 5 days. He experienced a gradual recovery of his symptoms. He was discharged from acute care with a walker, and used the walker for a few weeks, and a cane for a couple of months. Now he walks without assistance but endorses imbalance at times, not necessarily accentuated in dark spaces. He required bladder catheterization since the onset of symptoms, and for a good 6 months. After Lares catheter was discontinued, he continued to have major problems with urinary retention or urgency, and has been evaluated by our Urologists, who had major difficulty inserting catheter for urodynamic studies and therefore are suspecting obstruction and planning for a TURP in 8/2017. He also noted difficulty  feeling defecation at the time the symptoms began. He never had any motor or sensory symptoms in his upper extremities, or cranial nerve symptoms. He does not have other autonomic symptoms such as orthostatic intolerance/syncope, gastroparesis, etc. At this point he continues to have discomfort or neuropathic pain at his legs, managed with tramadol 3 times a day.     SUMMARY OF PREVIOUS WORKUP:      1) EMG  REFERRING PHYSICIAN:  Malena Akins MD, [facility]    CLINICAL PROBLEM:  This is a 35-year-old man with numbness and weakness in the legs.  Rule out Guillain-Tererro syndrome or other neuropathy.  Rule out myopathy.    TECHNICAL SUMMARY:  Needle EMG study was carried out in the right lower extremity.  Muscles studied included abductor hallucis, tibialis anterior, gastrocnemius, vastus medialis, extensor digitorum brevis, abductor digiti minimi pedis and vastus lateralis.  The findings were normal in the tibialis anterior, vastus medialis and vastus lateralis muscles.  With needling of the right abductor hallucis muscle, there were no motor unit potentials.  With needling of the gastrocnemius muscle, there were a few motor unit potentials and they had a normal amplitude and duration but they were very infrequent.  With needling of the extensor digitorum brevis muscle, there was only a single motor unit interference pattern.  Amplitude of motor unit potential and duration were increased.  Activity was normal at rest.  With needling of the abductor digiti minimi pedis, no motor unit potentials could be recorded.    Nerve conduction studies:  Right peroneal nerve motor distal latency is 5.8 milliseconds, amplitude is 7.1 millivolts, distance is 80 mm.   Right peroneal nerve proximal latency above the fibular head is 13.8 milliseconds, amplitude is 6.7 millivolts, distance is 365 mm, motor conduction velocity is 46 meters per second.    Right peroneal nerve motor latency stimulating below the fibular head is 12.3  milliseconds, amplitude is 6.6 millivolts, distance is 300 mm, motor conduction velocity is 46 meters per second.    Right tibial nerve motor distal latency is 6.5 milliseconds, amplitude is 9.2 millivolts, distance is 125 mm.    Right tibial nerve motor latency with stimulation in the popliteal fossa is 17.3 milliseconds, amplitude is 8.2 millivolts, distance is 385 mm, motor conduction velocity is 36 meters per second.    Left tibial nerve motor distal latency is 6.4 milliseconds, amplitude is 8.3 millivolts, distance is 140 mm.    Left tibial nerve motor proximal latency with stimulation in the popliteal fossa is 16.3 milliseconds, amplitude is 6.7 millivolts, distance is 390 mm, nerve conduction velocity is 39 meters per second.    IMPRESSION:  This study shows decreased motor conduction velocity in both tibial nerves right slightly greater than left.  The nerve conductions are normal in the right peroneal nerve.  There is an absence of motor unit potentials in the right abductor hallucis and abductor digiti minimi pedis muscles.  Only a single motor unit potential is recorded from the right extensor digitorum brevis muscle and this is a high amplitude potential.  Only a few motor units are recorded from the right gastrocnemius muscle.    The findings indicate motor neuropathy affecting the tibial nerves and peroneal nerves distally more than proximally.  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     2) IMAGING:    MRI HEAD WITHOUT AND WITH INTRAVENOUS CONTRAST:  INDICATION:  Ataxia; possible infection.    TECHNIQUE:  Sagittal T1-weighted and axial FLAIR, T2-weighted and   diffusion tensor sequences were obtained without gadolinium. Axial GRE,   axial, coronal, and sagittal T1-weighted images were obtained with   intravenous gadolinium.    COMPARISON:  None.    FINDINGS:  Patient motion artifact, despite his best efforts to remain   still, somewhat degrades the  examination. As visualized: Gray-white   differentiation preserved. No evidence for hemorrhage or acute/subacute   infarction. No abnormal contrast enhancement. Posterior fossa 3.5 x 4.3 x   5.0 cm CSF cleft favoring arachnoid cyst over veronica cisterna magna. Very   mild effacement of the adjacent cerebellar hemispheres, without signal   abnormality. Ventricles, cisterns normal in caliber. Major flow voids   preserved. No substantial opacification mastoid air cells. Opacified right   maxillary sinus.    IMPRESSION:    1.  No evidence for acute intracranial process.  2.  Opacified right maxillary sinus with inspissated secretions and/or   underlying fungal infection; acute on chronic sinusitis.  3.  Incidental retrocerebellar arachnoid cyst or veronica cisterna magna.    MRI CERVICAL SPINE WITHOUT AND WITH INTRAVENOUS CONTRAST:  INDICATION:  Can't feel feet.    TECHNIQUE:  Sagittal T1, second-echo T2, axial MPGR and 3-D   volume-acquired gradient-recalled sequences with sagittal and axial   post-contrast images were obtained.    COMPARISON:  None.    FINDINGS:  Cervical vertebral body heights and alignment preserved. No   substantial bone marrow edema.    C3-4 with very mild uncovertebral hypertrophy; no substantial canal or   foraminal narrowing. Remaining levels without substantial disc herniation   or canal or foraminal narrowing.    Cervical cord normal in course, caliber, and signal. No evidence for cord   signal abnormality. Vertebral artery flow voids preserved.    IMPRESSION:    No evidence for acute osseous abnormality. No evidence for cord or nerve   root compromise.    MRI LUMBAR SPINE WITH AND WITHOUT CONTRAST:  INDICATION:  Back pain. Suspected cauda equina syndrome.    COMPARISON:  None.    FINDINGS:  Normal alignment and curvature of the lumbar spine. Vertebral   body heights are well-maintained. No acute vertebral body fractures. No   paraspinal soft tissue masses or hematoma formation. Conus medullaris  ends   at the L1 vertebral body and is of normal size and signal intensity. No   suspicious enhancement identified on today's examination.    L1-2:  Disc desiccation. No disc displacement. No evidence of central   canal stenosis. Mild right neural foraminal narrowing.    L2-3:  Disc desiccation. No disc displacement. No evidence of central   canal stenosis or neural foraminal narrowing.    L3-4:  Disc desiccation. No disc displacement. No evidence of central   canal stenosis or neural foraminal narrowing.    L4-5:  No disc displacement. No evidence of central canal stenosis or   neural foraminal narrowing. Mild bilateral facet arthropathy.    L5-S1:  Shallow broad-based disc displacement. No evidence of central   canal stenosis. Mild right neural foraminal narrowing. Mild bilateral   facet arthropathy.    IMPRESSION:    1.  No evidence of central canal stenosis involving any of the lumbar   vertebral body levels.  2.  No suspicious enhancement identified on today's examination.  3.  Mild multi-level neural foraminal narrowing as described above.    Results were discussed by Dr. Vaughn to Dr. Ontiveros at 1605 hours on   01/26/2016.    MRI THORACIC SPINE WITHOUT AND WITH INTRAVENOUS CONTRAST:  INDICATION:  Can't feel feet.    TECHNIQUE:  Sagittal STIR and T1- and T2-weighted and axial T1-weighted   sequences were obtained without intravenous gadolinium. Axial and sagittal   T1-weighted sequences were obtained with intravenous gadolinium.    COMPARISON:    CT pulmonary embolus study 10/11/2015.  MRI lumbar spine, 1/26/16.     FINDINGS:  Patient motion artifact, despite his best efforts to remain   still, somehwat degrades the examination. Thoracic vertebral body heights   and alignment preserved. Chronic small Schmorl's nodes several thoracic   levels T6-T12, without substantial vertebral body height loss. Incidental   T11 vertebral body hemangioma.   Tiny disc protrusions at T5-6 and T7-8, without substantial canal or    foraminal narrowing. T9-10 left central-subarticular small protrusion   mildly effaces the left ventral thecal sac, with overall minimal canal   narrowing. Minimal left foraminal narrowing.   Thoracic cord normal in course, caliber, and signal.    IMPRESSION:  No evidence for acute thoracic abnormality; small multilevel   disc protrusion, without evidence for cord or nerve root compromise.    LABS (1/26/2016): CK mildly elevated, 293. Aldolase, TSH, CRP, protein electrophoresis, JANELL, Lyme serologies, negative or normal.   CSF: protein 34 mg/dl, WBC 2, glucose 67 (normal).    History reviewed. No pertinent past medical history.     No Known Allergies    Family History: Noncontributory in this case.    Social History:   Social History   Substance Use Topics     Smoking status: Never Smoker     Smokeless tobacco: Not on file     Alcohol use No     Answers for HPI/ROS submitted by the patient on 7/19/2017   General Symptoms: No  Skin Symptoms: No  HENT Symptoms: No  EYE SYMPTOMS: No  HEART SYMPTOMS: No  LUNG SYMPTOMS: No  INTESTINAL SYMPTOMS: No  URINARY SYMPTOMS: Yes  REPRODUCTIVE SYMPTOMS: No  SKELETAL SYMPTOMS: Yes  BLOOD SYMPTOMS: No  NERVOUS SYSTEM SYMPTOMS: Yes  MENTAL HEALTH SYMPTOMS: Yes  Trouble holding urine or incontinence: Yes  Pain or burning: Yes  Trouble starting or stopping: Yes  Increased frequency of urination: Yes  Blood in urine: No  Decreased frequency of urination: No  Frequent nighttime urination: Yes  Flank pain: No  Difficulty emptying bladder: Yes  Back pain: Yes  Muscle aches: Yes  Neck pain: No  Swollen joints: No  Joint pain: Yes  Bone pain: No  Muscle cramps: No  Muscle weakness: Yes  Joint stiffness: Yes  Bone fracture: No  Trouble with coordination: No  Dizziness or trouble with balance: No  Fainting or black-out spells: No  Memory loss: No  Headache: No  Seizures: No  Speech problems: No  Tingling: Yes  Tremor: No  Weakness: Yes  Difficulty walking: Yes  Paralysis: No  Numbness:  "Yes  Nervous or Anxious: Yes  Depression: Yes  Trouble sleeping: Yes  Trouble thinking or concentrating: No  Mood changes: No  Panic attacks: No    MEDS:   Current Outpatient Prescriptions:      ALPRAZolam (XANAX) 0.5 MG tablet, Take 0.5 mg by mouth daily as needed, Disp: , Rfl:      FLUoxetine (PROZAC) 40 MG capsule, Take 40 mg by mouth daily, Disp: , Rfl:      melatonin 5 MG CAPS, Take 5 mg by mouth daily, Disp: , Rfl:      naproxen sodium (ANAPROX) 220 MG tablet, Take 220 mg by mouth daily, Disp: , Rfl:      NIFEdipine ER osmotic (PROCARDIA XL) 30 MG 24 hr tablet, Take 30 mg by mouth daily, Disp: , Rfl:      polyethylene glycol (MIRALAX/GLYCOLAX) powder, MIX 17 GRAMS (1 CAPFUL) IN LIQUID AND DRINK TWICE DAILY, Disp: , Rfl:      tamsulosin (FLOMAX) 0.4 MG capsule, Take 0.4 mg by mouth daily, Disp: , Rfl:      traMADol (ULTRAM) 50 MG tablet, Take  mg by mouth nightly as needed, Disp: , Rfl:       EXAMINATION: /80  Pulse 68  Ht 1.803 m (5' 11\")    NEUROLOGICAL: AAOx 3. Cranial nerves II-XII normal. Motor examination shows normal strength in all muscles of bilateral upper extremities proximally and distally. At the legs patient has normal hip flexion, knee extension, knee flexion and great toe extension. Tone is normal in arms and legs. No obvious muscle atrophy. Reflexes 2+ in biceps, BR, triceps, and knees, absent at right ankle, present, 1+ at left. Plantar responses bilaterally flexor. Sensory exam intact to vibration-over 14 seconds at toes, 20 at index fingers. Position sensation normal at the toes. Pinprick is reduced at the toes but normal in dorsum of foot, ankles and calves. Normal tactile and pin sensation in upper extremities. Coordination intact by finger to nose. No dysmetria or tremor. Gait: Able to rise from chair without assistance. Romberg negative. Capable of doing 4-5 steps of tandem gait without major difficulties. Can stands on heels, but has significant difficulty walking on toes " indicating gastrocnemius weakness.     IMPRESSION:  Acute cauda equina syndrome, probably due to lumbar spinal cord infarct or less likely inflammatory process. NOT typical Guillain [place] syndrome.    COMMENTS: I spent about 45 minutes with Mr Velazquez of which more than half was counseling. I explained to him the following: Admittedly, it is difficult to be entirely sure what happened to him on 1/2016. There are numerous things in his history, however, that are not at all typical for Guillain [place] syndrome. First, the timing- his symptoms occurred almost instantly over 30 minutes, and did not progress in the subsequent days. GBS typically progresses over days, not in 30 minutes. So rapid onset favors a vascular cause of the syndrome. Second, urinary retention can occur with GBS, but it is very unusual, and it is even more unusual for this to occur very early in the course of GBS, and to persist for years after the incident. Third, his EMG done at [facility] in my opinion did not show any convincing evidence of demyelinating neuropathy, and the findings reported may well be due to an axonal lesion at the cauda equina (see below for details). In addition, neither MRI of the lumbar spine nor CSF examination provided any supportive evidence for nerve root inflammation.  In my opinion, the patient had acute cauda equina syndrome without any compressive lesion. This is supported by the report of perineal anesthesia, early bowel/bladder dysfunction, and evidence of bilateral S1 radiculopathies on his exam (he cannot stand on his toes, and has depressed ankle reflexes, which in fact are the only abnormal things on his lower extremity exam). It is possible that he had a lumbar spinal cord infarct. Those may not always show up on spine MRIs, and in those cases the diagnosis is clinical. An inflammatory cause of cauda equina polyradiculitis cannot be fully excluded, but at the same time there is also nothing to provide support for  this theory. Patients with lumbar spinal cord infarcts tend to slowly improve on their own, and therefore I am not convinced it is the IVIG that made him better.   Regardless of final diagnosis, I don't think the treatment at this point would change. I asked patient to follow up with his primary care doctor or local Neurologist for treatment of his neuropathic pain. If he wants to try gabapentin or Lyrica because he feels tramadol is not helpful enough, I am fine with his preference. He should continue following the instruction of his Urologist- I don't have anything to add there. I told him he may improve further over time, but some patients may experience more chronic pain or bladder control issues following cauda equina syndrome and he should be prepared for this potential outcome. The problem should not get worse over time or relapse, and of course there is no indication for steroids, repeat IVIG courses, or other immunotherapy with this diagnosis.    Thank you for allowing me to participate in the care of Mr Velazquez.  I will see him in follow up in our Clinic as needed.    Sincerely,    Sukhi Quintana MD   of Neurology  [facility]    ADDENDUM: I ordered a repeat lumbar spine MRI with contrast to be done locally, out of curiosity for the final diagnosis. I will discuss this plan with the patient. KAYLEN

## 2017-07-19 NOTE — LETTER
7/19/2017       RE: Alcides Velazquez  [address]  [place] [address]     Dear Colleague,    Thank you for referring your patient, Alcides Velazquez, to the [facility] NEUROLOGY at [facility]. Please see a copy of my visit note below.    Ismael Yan MD  [address]  [place] [address]    Carlos Farias MD  [facility]  [address], [place]   [address]    Dear Thanh Yan and Jarrett,     I had the pleasure to see Mr Velazquez at the [facility] today for a second opinion on his diagnosis. 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 He had received influenza vaccination a few weeks prior to onset of his symptoms. On 1/25/2016 he slided off his [place] in his farm (he was shoveling snow) when he experienced acute onset of numbness from the perineal region to the thighs, lower legs and feet bilaterally and inability to stand. The symptoms, according to his wife and him, evolved over at most 30 minutes to 1 hour. He sought emergent medical care in [facility]. He did NOT experience back pain at that time. He was admitted in [facility] and underwent MRI of brain, cervical, thoracic and lumbar spine with contrast. No findings to explain his presentation were seen. He also had a CSF examination on 1/26/2016 which was normal. He had an EMG on 1/28/2016 by Dr Farias. Report is attached in subsequent section.   He was treated for presumed Guillain [place] syndrome with IVIG for 5 days. He experienced a gradual recovery of his symptoms. He was discharged from acute care with a walker, and used the walker for a few weeks, and a cane for a couple of months. Now he walks without assistance but endorses imbalance at times, not necessarily accentuated in dark spaces. He required bladder catheterization since the onset of symptoms, and for a good 6 months. After Lares catheter was discontinued, he continued to  have major problems with urinary retention or urgency, and has been evaluated by our Urologists, who had major difficulty inserting catheter for urodynamic studies and therefore are suspecting obstruction and planning for a TURP in 8/2017. He also noted difficulty feeling defecation at the time the symptoms began. He never had any motor or sensory symptoms in his upper extremities, or cranial nerve symptoms. He does not have other autonomic symptoms such as orthostatic intolerance/syncope, gastroparesis, etc. At this point he continues to have discomfort or neuropathic pain at his legs, managed with tramadol 3 times a day.     SUMMARY OF PREVIOUS WORKUP:      1) EMG  REFERRING PHYSICIAN:  Malena Akins MD, [facility]    CLINICAL PROBLEM:  This is a 35-year-old man with numbness and weakness in the legs.  Rule out Guillain-[place] syndrome or other neuropathy.  Rule out myopathy.    TECHNICAL SUMMARY:  Needle EMG study was carried out in the right lower extremity.  Muscles studied included abductor hallucis, tibialis anterior, gastrocnemius, vastus medialis, extensor digitorum brevis, abductor digiti minimi pedis and vastus lateralis.  The findings were normal in the tibialis anterior, vastus medialis and vastus lateralis muscles.  With needling of the right abductor hallucis muscle, there were no motor unit potentials.  With needling of the gastrocnemius muscle, there were a few motor unit potentials and they had a normal amplitude and duration but they were very infrequent.  With needling of the extensor digitorum brevis muscle, there was only a single motor unit interference pattern.  Amplitude of motor unit potential and duration were increased.  Activity was normal at rest.  With needling of the abductor digiti minimi pedis, no motor unit potentials could be recorded.    Nerve conduction studies:  Right peroneal nerve motor distal latency is 5.8 milliseconds, amplitude is 7.1 millivolts, distance is 80 mm.   Right  peroneal nerve proximal latency above the fibular head is 13.8 milliseconds, amplitude is 6.7 millivolts, distance is 365 mm, motor conduction velocity is 46 meters per second.    Right peroneal nerve motor latency stimulating below the fibular head is 12.3 milliseconds, amplitude is 6.6 millivolts, distance is 300 mm, motor conduction velocity is 46 meters per second.    Right tibial nerve motor distal latency is 6.5 milliseconds, amplitude is 9.2 millivolts, distance is 125 mm.    Right tibial nerve motor latency with stimulation in the popliteal fossa is 17.3 milliseconds, amplitude is 8.2 millivolts, distance is 385 mm, motor conduction velocity is 36 meters per second.    Left tibial nerve motor distal latency is 6.4 milliseconds, amplitude is 8.3 millivolts, distance is 140 mm.    Left tibial nerve motor proximal latency with stimulation in the popliteal fossa is 16.3 milliseconds, amplitude is 6.7 millivolts, distance is 390 mm, nerve conduction velocity is 39 meters per second.    IMPRESSION:  This study shows decreased motor conduction velocity in both tibial nerves right slightly greater than left.  The nerve conductions are normal in the right peroneal nerve.  There is an absence of motor unit potentials in the right abductor hallucis and abductor digiti minimi pedis muscles.  Only a single motor unit potential is recorded from the right extensor digitorum brevis muscle and this is a high amplitude potential.  Only a few motor units are recorded from the right gastrocnemius muscle.    The findings indicate motor neuropathy affecting the tibial nerves and peroneal nerves distally more than proximally.  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     2) IMAGING:    MRI HEAD WITHOUT AND WITH INTRAVENOUS CONTRAST:  INDICATION:  Ataxia; possible infection.    TECHNIQUE:  Sagittal T1-weighted and axial FLAIR, T2-weighted and   diffusion tensor sequences were obtained  without gadolinium. Axial GRE,   axial, coronal, and sagittal T1-weighted images were obtained with   intravenous gadolinium.    COMPARISON:  None.    FINDINGS:  Patient motion artifact, despite his best efforts to remain   still, somewhat degrades the examination. As visualized: Gray-white   differentiation preserved. No evidence for hemorrhage or acute/subacute   infarction. No abnormal contrast enhancement. Posterior fossa 3.5 x 4.3 x   5.0 cm CSF cleft favoring arachnoid cyst over veronica cisterna magna. Very   mild effacement of the adjacent cerebellar hemispheres, without signal   abnormality. Ventricles, cisterns normal in caliber. Major flow voids   preserved. No substantial opacification mastoid air cells. Opacified right   maxillary sinus.    IMPRESSION:    1.  No evidence for acute intracranial process.  2.  Opacified right maxillary sinus with inspissated secretions and/or   underlying fungal infection; acute on chronic sinusitis.  3.  Incidental retrocerebellar arachnoid cyst or veronica cisterna magna.    MRI CERVICAL SPINE WITHOUT AND WITH INTRAVENOUS CONTRAST:  INDICATION:  Can't feel feet.    TECHNIQUE:  Sagittal T1, second-echo T2, axial MPGR and 3-D   volume-acquired gradient-recalled sequences with sagittal and axial   post-contrast images were obtained.    COMPARISON:  None.    FINDINGS:  Cervical vertebral body heights and alignment preserved. No   substantial bone marrow edema.    C3-4 with very mild uncovertebral hypertrophy; no substantial canal or   foraminal narrowing. Remaining levels without substantial disc herniation   or canal or foraminal narrowing.    Cervical cord normal in course, caliber, and signal. No evidence for cord   signal abnormality. Vertebral artery flow voids preserved.    IMPRESSION:    No evidence for acute osseous abnormality. No evidence for cord or nerve   root compromise.    MRI LUMBAR SPINE WITH AND WITHOUT CONTRAST:  INDICATION:  Back pain. Suspected cauda equina  syndrome.    COMPARISON:  None.    FINDINGS:  Normal alignment and curvature of the lumbar spine. Vertebral   body heights are well-maintained. No acute vertebral body fractures. No   paraspinal soft tissue masses or hematoma formation. Conus medullaris ends   at the L1 vertebral body and is of normal size and signal intensity. No   suspicious enhancement identified on today's examination.    L1-2:  Disc desiccation. No disc displacement. No evidence of central   canal stenosis. Mild right neural foraminal narrowing.    L2-3:  Disc desiccation. No disc displacement. No evidence of central   canal stenosis or neural foraminal narrowing.    L3-4:  Disc desiccation. No disc displacement. No evidence of central   canal stenosis or neural foraminal narrowing.    L4-5:  No disc displacement. No evidence of central canal stenosis or   neural foraminal narrowing. Mild bilateral facet arthropathy.    L5-S1:  Shallow broad-based disc displacement. No evidence of central   canal stenosis. Mild right neural foraminal narrowing. Mild bilateral   facet arthropathy.    IMPRESSION:    1.  No evidence of central canal stenosis involving any of the lumbar   vertebral body levels.  2.  No suspicious enhancement identified on today's examination.  3.  Mild multi-level neural foraminal narrowing as described above.    Results were discussed by Dr. Vaughn to Dr. Ontiveros at 1605 hours on   01/26/2016.    MRI THORACIC SPINE WITHOUT AND WITH INTRAVENOUS CONTRAST:  INDICATION:  Can't feel feet.    TECHNIQUE:  Sagittal STIR and T1- and T2-weighted and axial T1-weighted   sequences were obtained without intravenous gadolinium. Axial and sagittal   T1-weighted sequences were obtained with intravenous gadolinium.    COMPARISON:    CT pulmonary embolus study 10/11/2015.  MRI lumbar spine, 1/26/16.     FINDINGS:  Patient motion artifact, despite his best efforts to remain   still, somehwat degrades the examination. Thoracic vertebral body heights    and alignment preserved. Chronic small Schmorl's nodes several thoracic   levels T6-T12, without substantial vertebral body height loss. Incidental   T11 vertebral body hemangioma.   Tiny disc protrusions at T5-6 and T7-8, without substantial canal or   foraminal narrowing. T9-10 left central-subarticular small protrusion   mildly effaces the left ventral thecal sac, with overall minimal canal   narrowing. Minimal left foraminal narrowing.   Thoracic cord normal in course, caliber, and signal.    IMPRESSION:  No evidence for acute thoracic abnormality; small multilevel   disc protrusion, without evidence for cord or nerve root compromise.    LABS (1/26/2016): CK mildly elevated, 293. Aldolase, TSH, CRP, protein electrophoresis, JANELL, Lyme serologies, negative or normal.   CSF: protein 34 mg/dl, WBC 2, glucose 67 (normal).    History reviewed. No pertinent past medical history.     No Known Allergies    Family History: Noncontributory in this case.    Social History:   Social History   Substance Use Topics     Smoking status: Never Smoker     Smokeless tobacco: Not on file     Alcohol use No     Answers for HPI/ROS submitted by the patient on 7/19/2017   General Symptoms: No  Skin Symptoms: No  HENT Symptoms: No  EYE SYMPTOMS: No  HEART SYMPTOMS: No  LUNG SYMPTOMS: No  INTESTINAL SYMPTOMS: No  URINARY SYMPTOMS: Yes  REPRODUCTIVE SYMPTOMS: No  SKELETAL SYMPTOMS: Yes  BLOOD SYMPTOMS: No  NERVOUS SYSTEM SYMPTOMS: Yes  MENTAL HEALTH SYMPTOMS: Yes  Trouble holding urine or incontinence: Yes  Pain or burning: Yes  Trouble starting or stopping: Yes  Increased frequency of urination: Yes  Blood in urine: No  Decreased frequency of urination: No  Frequent nighttime urination: Yes  Flank pain: No  Difficulty emptying bladder: Yes  Back pain: Yes  Muscle aches: Yes  Neck pain: No  Swollen joints: No  Joint pain: Yes  Bone pain: No  Muscle cramps: No  Muscle weakness: Yes  Joint stiffness: Yes  Bone fracture: No  Trouble with  "coordination: No  Dizziness or trouble with balance: No  Fainting or black-out spells: No  Memory loss: No  Headache: No  Seizures: No  Speech problems: No  Tingling: Yes  Tremor: No  Weakness: Yes  Difficulty walking: Yes  Paralysis: No  Numbness: Yes  Nervous or Anxious: Yes  Depression: Yes  Trouble sleeping: Yes  Trouble thinking or concentrating: No  Mood changes: No  Panic attacks: No    MEDS:   Current Outpatient Prescriptions:      ALPRAZolam (XANAX) 0.5 MG tablet, Take 0.5 mg by mouth daily as needed, Disp: , Rfl:      FLUoxetine (PROZAC) 40 MG capsule, Take 40 mg by mouth daily, Disp: , Rfl:      melatonin 5 MG CAPS, Take 5 mg by mouth daily, Disp: , Rfl:      naproxen sodium (ANAPROX) 220 MG tablet, Take 220 mg by mouth daily, Disp: , Rfl:      NIFEdipine ER osmotic (PROCARDIA XL) 30 MG 24 hr tablet, Take 30 mg by mouth daily, Disp: , Rfl:      polyethylene glycol (MIRALAX/GLYCOLAX) powder, MIX 17 GRAMS (1 CAPFUL) IN LIQUID AND DRINK TWICE DAILY, Disp: , Rfl:      tamsulosin (FLOMAX) 0.4 MG capsule, Take 0.4 mg by mouth daily, Disp: , Rfl:      traMADol (ULTRAM) 50 MG tablet, Take  mg by mouth nightly as needed, Disp: , Rfl:       EXAMINATION: /80  Pulse 68  Ht 1.803 m (5' 11\")    NEUROLOGICAL: AAOx 3. Cranial nerves II-XII normal. Motor examination shows normal strength in all muscles of bilateral upper extremities proximally and distally. At the legs patient has normal hip flexion, knee extension, knee flexion and great toe extension. Tone is normal in arms and legs. No obvious muscle atrophy. Reflexes 2+ in biceps, BR, triceps, and knees, absent at right ankle, present, 1+ at left. Plantar responses bilaterally flexor. Sensory exam intact to vibration-over 14 seconds at toes, 20 at index fingers. Position sensation normal at the toes. Pinprick is reduced at the toes but normal in dorsum of foot, ankles and calves. Normal tactile and pin sensation in upper extremities. Coordination intact " by finger to nose. No dysmetria or tremor. Gait: Able to rise from chair without assistance. Romberg negative. Capable of doing 4-5 steps of tandem gait without major difficulties. Can stands on heels, but has significant difficulty walking on toes indicating gastrocnemius weakness.     IMPRESSION:  Acute cauda equina syndrome, probably due to lumbar spinal cord infarct or less likely inflammatory process. NOT typical Guillain [place] syndrome.    COMMENTS: I spent about 45 minutes with Mr Velazquez of which more than half was counseling. I explained to him the following: Admittedly, it is difficult to be entirely sure what happened to him on 1/2016. There are numerous things in his history, however, that are not at all typical for Guillain [place] syndrome. First, the timing- his symptoms occurred almost instantly over 30 minutes, and did not progress in the subsequent days. GBS typically progresses over days, not in 30 minutes. So rapid onset favors a vascular cause of the syndrome. Second, urinary retention can occur with GBS, but it is very unusual, and it is even more unusual for this to occur very early in the course of GBS, and to persist for years after the incident. Third, his EMG done at [facility] in my opinion did not show any convincing evidence of demyelinating neuropathy, and the findings reported may well be due to an axonal lesion at the cauda equina (see below for details). In addition, neither MRI of the lumbar spine nor CSF examination provided any supportive evidence for nerve root inflammation.  In my opinion, the patient had acute cauda equina syndrome without any compressive lesion. This is supported by the report of perineal anesthesia, early bowel/bladder dysfunction, and evidence of bilateral S1 radiculopathies on his exam (he cannot stand on his toes, and has depressed ankle reflexes, which in fact are the only abnormal things on his lower extremity exam). It is possible that he had a lumbar spinal  cord infarct. Those may not always show up on spine MRIs, and in those cases the diagnosis is clinical. An inflammatory cause of cauda equina polyradiculitis cannot be fully excluded, but at the same time there is also nothing to provide support for this theory. Patients with lumbar spinal cord infarcts tend to slowly improve on their own, and therefore I am not convinced it is the IVIG that made him better.   Regardless of final diagnosis, I don't think the treatment at this point would change. I asked patient to follow up with his primary care doctor or local Neurologist for treatment of his neuropathic pain. If he wants to try gabapentin or Lyrica because he feels tramadol is not helpful enough, I am fine with his preference. He should continue following the instruction of his Urologist- I don't have anything to add there. I told him he may improve further over time, but some patients may experience more chronic pain or bladder control issues following cauda equina syndrome and he should be prepared for this potential outcome. The problem should not get worse over time or relapse, and of course there is no indication for steroids, repeat IVIG courses, or other immunotherapy with this diagnosis.    Thank you for allowing me to participate in the care of Mr Velazquez.  I will see him in follow up in our Clinic as needed.    Sincerely,    Sukhi Quintana MD   of Neurology  [facility]    ADDENDUM: I ordered a repeat lumbar spine MRI with contrast to be done locally, out of curiosity for the final diagnosis. I will discuss this plan with the patient. KAYLEN

## 2017-07-19 NOTE — NURSING NOTE
Chief Complaint   Patient presents with     Consult     Neuropathy GB, weakness    Savanna Cantu, CMA

## 2017-07-20 ENCOUNTER — TELEPHONE (OUTPATIENT)
Dept: NEUROLOGY | Facility: CLINIC | Age: 37
End: 2017-07-20

## 2017-07-20 NOTE — TELEPHONE ENCOUNTER
Call out to inform patient that, per Dr. Quintana, he should have MRI repeated at same location first MRI was done. Patient understands rationale for MRI in order to compare to previous and agrees to this plan.     Will mail order to patient and he will have it completed at his earliest convenience. Encouraged patient to call once this is complete, so that we may follow up.

## 2017-07-27 ENCOUNTER — CARE COORDINATION (OUTPATIENT)
Dept: UROLOGY | Facility: CLINIC | Age: 37
End: 2017-07-27

## 2017-07-27 DIAGNOSIS — N32.0 BLADDER NECK OBSTRUCTION: Primary | ICD-10-CM

## 2017-07-27 NOTE — PROGRESS NOTES
Upcoming surgical procedure with Dr Chito Cadet on 8.11.17 at 1300, check in at 1130.   Surgery at ([facility] or [place]): [facility]  Patient is having a tranurethral incision of the prostate  Pre-op physical completed: YES. Date-7.25.17.  PAC: No  Bowel Prep: No, not needed  Urine culture completed: Will have it completed. Faxed orders to [facility]: [phone]    Post-operative appointment needed: YES. Date-9.11.17 at 1000 with Dr Payton.  All questions answered.    Patient verbalized understanding. No further questions.      Lily Schuler, RN-BC, BSN  Care Coordinator - Reconstructive Urology  [phone]

## 2017-07-28 ENCOUNTER — TRANSFERRED RECORDS (OUTPATIENT)
Dept: HEALTH INFORMATION MANAGEMENT | Facility: CLINIC | Age: 37
End: 2017-07-28

## 2017-08-10 ENCOUNTER — ANESTHESIA EVENT (OUTPATIENT)
Dept: SURGERY | Facility: AMBULATORY SURGERY CENTER | Age: 37
End: 2017-08-10

## 2017-08-11 ENCOUNTER — ANESTHESIA (OUTPATIENT)
Dept: SURGERY | Facility: AMBULATORY SURGERY CENTER | Age: 37
End: 2017-08-11

## 2017-08-11 ENCOUNTER — HOSPITAL ENCOUNTER (OUTPATIENT)
Facility: AMBULATORY SURGERY CENTER | Age: 37
End: 2017-08-11
Attending: UROLOGY

## 2017-08-11 VITALS
TEMPERATURE: 97 F | WEIGHT: 243 LBS | HEIGHT: 71 IN | BODY MASS INDEX: 34.02 KG/M2 | OXYGEN SATURATION: 97 % | SYSTOLIC BLOOD PRESSURE: 128 MMHG | RESPIRATION RATE: 16 BRPM | DIASTOLIC BLOOD PRESSURE: 75 MMHG

## 2017-08-11 DIAGNOSIS — N35.919 URETHRAL STRICTURE, UNSPECIFIED: Primary | ICD-10-CM

## 2017-08-11 RX ORDER — LIDOCAINE HYDROCHLORIDE 20 MG/ML
INJECTION, SOLUTION INFILTRATION; PERINEURAL PRN
Status: DISCONTINUED | OUTPATIENT
Start: 2017-08-11 | End: 2017-08-11

## 2017-08-11 RX ORDER — PROPOFOL 10 MG/ML
INJECTION, EMULSION INTRAVENOUS PRN
Status: DISCONTINUED | OUTPATIENT
Start: 2017-08-11 | End: 2017-08-11

## 2017-08-11 RX ORDER — GABAPENTIN 300 MG/1
300 CAPSULE ORAL ONCE
Status: COMPLETED | OUTPATIENT
Start: 2017-08-11 | End: 2017-08-11

## 2017-08-11 RX ORDER — MEPERIDINE HYDROCHLORIDE 25 MG/ML
12.5 INJECTION INTRAMUSCULAR; INTRAVENOUS; SUBCUTANEOUS
Status: DISCONTINUED | OUTPATIENT
Start: 2017-08-11 | End: 2017-08-12 | Stop reason: HOSPADM

## 2017-08-11 RX ORDER — DEXAMETHASONE SODIUM PHOSPHATE 4 MG/ML
INJECTION, SOLUTION INTRA-ARTICULAR; INTRALESIONAL; INTRAMUSCULAR; INTRAVENOUS; SOFT TISSUE PRN
Status: DISCONTINUED | OUTPATIENT
Start: 2017-08-11 | End: 2017-08-11

## 2017-08-11 RX ORDER — ONDANSETRON 2 MG/ML
INJECTION INTRAMUSCULAR; INTRAVENOUS PRN
Status: DISCONTINUED | OUTPATIENT
Start: 2017-08-11 | End: 2017-08-11

## 2017-08-11 RX ORDER — SODIUM CHLORIDE, SODIUM LACTATE, POTASSIUM CHLORIDE, CALCIUM CHLORIDE 600; 310; 30; 20 MG/100ML; MG/100ML; MG/100ML; MG/100ML
INJECTION, SOLUTION INTRAVENOUS CONTINUOUS PRN
Status: DISCONTINUED | OUTPATIENT
Start: 2017-08-11 | End: 2017-08-11

## 2017-08-11 RX ORDER — FENTANYL CITRATE 50 UG/ML
25-50 INJECTION, SOLUTION INTRAMUSCULAR; INTRAVENOUS EVERY 5 MIN PRN
Status: DISCONTINUED | OUTPATIENT
Start: 2017-08-11 | End: 2017-08-11 | Stop reason: HOSPADM

## 2017-08-11 RX ORDER — NALOXONE HYDROCHLORIDE 0.4 MG/ML
.1-.4 INJECTION, SOLUTION INTRAMUSCULAR; INTRAVENOUS; SUBCUTANEOUS
Status: DISCONTINUED | OUTPATIENT
Start: 2017-08-11 | End: 2017-08-12 | Stop reason: HOSPADM

## 2017-08-11 RX ORDER — PROPOFOL 10 MG/ML
INJECTION, EMULSION INTRAVENOUS CONTINUOUS PRN
Status: DISCONTINUED | OUTPATIENT
Start: 2017-08-11 | End: 2017-08-11

## 2017-08-11 RX ORDER — KETOROLAC TROMETHAMINE 30 MG/ML
INJECTION, SOLUTION INTRAMUSCULAR; INTRAVENOUS PRN
Status: DISCONTINUED | OUTPATIENT
Start: 2017-08-11 | End: 2017-08-11

## 2017-08-11 RX ORDER — ACETAMINOPHEN 325 MG/1
975 TABLET ORAL ONCE
Status: COMPLETED | OUTPATIENT
Start: 2017-08-11 | End: 2017-08-11

## 2017-08-11 RX ORDER — CEFAZOLIN SODIUM 1 G/3ML
1 INJECTION, POWDER, FOR SOLUTION INTRAMUSCULAR; INTRAVENOUS SEE ADMIN INSTRUCTIONS
Status: DISCONTINUED | OUTPATIENT
Start: 2017-08-11 | End: 2017-08-11 | Stop reason: HOSPADM

## 2017-08-11 RX ORDER — ONDANSETRON 2 MG/ML
4 INJECTION INTRAMUSCULAR; INTRAVENOUS EVERY 30 MIN PRN
Status: DISCONTINUED | OUTPATIENT
Start: 2017-08-11 | End: 2017-08-12 | Stop reason: HOSPADM

## 2017-08-11 RX ORDER — LEVOFLOXACIN 500 MG/1
500 TABLET, FILM COATED ORAL DAILY
Qty: 4 TABLET | Refills: 0 | Status: SHIPPED | OUTPATIENT
Start: 2017-08-11 | End: 2018-05-01

## 2017-08-11 RX ORDER — SODIUM CHLORIDE, SODIUM LACTATE, POTASSIUM CHLORIDE, CALCIUM CHLORIDE 600; 310; 30; 20 MG/100ML; MG/100ML; MG/100ML; MG/100ML
INJECTION, SOLUTION INTRAVENOUS CONTINUOUS
Status: DISCONTINUED | OUTPATIENT
Start: 2017-08-11 | End: 2017-08-12 | Stop reason: HOSPADM

## 2017-08-11 RX ORDER — ONDANSETRON 4 MG/1
4 TABLET, ORALLY DISINTEGRATING ORAL EVERY 30 MIN PRN
Status: DISCONTINUED | OUTPATIENT
Start: 2017-08-11 | End: 2017-08-12 | Stop reason: HOSPADM

## 2017-08-11 RX ORDER — LEVOFLOXACIN 500 MG/1
500 TABLET, FILM COATED ORAL DAILY
Qty: 4 TABLET | Refills: 0 | Status: SHIPPED | OUTPATIENT
Start: 2017-08-11 | End: 2017-08-11

## 2017-08-11 RX ORDER — FENTANYL CITRATE 50 UG/ML
INJECTION, SOLUTION INTRAMUSCULAR; INTRAVENOUS PRN
Status: DISCONTINUED | OUTPATIENT
Start: 2017-08-11 | End: 2017-08-11

## 2017-08-11 RX ADMIN — PROPOFOL 200 MG: 10 INJECTION, EMULSION INTRAVENOUS at 13:27

## 2017-08-11 RX ADMIN — SODIUM CHLORIDE, SODIUM LACTATE, POTASSIUM CHLORIDE, CALCIUM CHLORIDE: 600; 310; 30; 20 INJECTION, SOLUTION INTRAVENOUS at 13:24

## 2017-08-11 RX ADMIN — PROPOFOL 200 MCG/KG/MIN: 10 INJECTION, EMULSION INTRAVENOUS at 13:27

## 2017-08-11 RX ADMIN — GABAPENTIN 300 MG: 300 CAPSULE ORAL at 11:10

## 2017-08-11 RX ADMIN — ONDANSETRON 4 MG: 2 INJECTION INTRAMUSCULAR; INTRAVENOUS at 13:34

## 2017-08-11 RX ADMIN — FENTANYL CITRATE 50 MCG: 50 INJECTION, SOLUTION INTRAMUSCULAR; INTRAVENOUS at 13:38

## 2017-08-11 RX ADMIN — ACETAMINOPHEN 975 MG: 325 TABLET ORAL at 11:10

## 2017-08-11 RX ADMIN — LIDOCAINE HYDROCHLORIDE 100 MG: 20 INJECTION, SOLUTION INFILTRATION; PERINEURAL at 13:27

## 2017-08-11 RX ADMIN — PROPOFOL: 10 INJECTION, EMULSION INTRAVENOUS at 13:44

## 2017-08-11 RX ADMIN — PROPOFOL: 10 INJECTION, EMULSION INTRAVENOUS at 14:04

## 2017-08-11 RX ADMIN — DEXAMETHASONE SODIUM PHOSPHATE 4 MG: 4 INJECTION, SOLUTION INTRA-ARTICULAR; INTRALESIONAL; INTRAMUSCULAR; INTRAVENOUS; SOFT TISSUE at 13:27

## 2017-08-11 RX ADMIN — KETOROLAC TROMETHAMINE 30 MG: 30 INJECTION, SOLUTION INTRAMUSCULAR; INTRAVENOUS at 13:34

## 2017-08-11 RX ADMIN — FENTANYL CITRATE 50 MCG: 50 INJECTION, SOLUTION INTRAMUSCULAR; INTRAVENOUS at 13:27

## 2017-08-11 ASSESSMENT — COPD QUESTIONNAIRES: COPD: 0

## 2017-08-11 ASSESSMENT — LIFESTYLE VARIABLES: TOBACCO_USE: 0

## 2017-08-11 NOTE — OR NURSING
Patient stated he was only experiencing mild chest tightness in Ph II, but felt better than he did prior to Versed. Dr. Garcia approved for discharge. All VSS.    When he uncovered himself to get dressed, he noticed a moderate amount of bleeding from tip of urethra. Assessed by Dr. Cadet's fellow who stated it appeared normal. Pt has clinic and on-call phone numbers for further questions or concerns.

## 2017-08-11 NOTE — DISCHARGE INSTRUCTIONS
[facility]  Home Care Following Anesthesia  For 24 hours after surgery:  1. Get plenty of rest.  A responsible adult must stay with you for at least 24 hours after you leave the surgery center.  2. Do not drive or use heavy equipment.  If you have weakness or tingling, don't drive or use heavy equipment until this feeling goes away.   3. Do not drink alcohol.   4. Avoid strenuous or risky activities.  Ask for help when climbing stairs.  5. You may feel lightheaded.  IF so, sit for a few minutes before standing.  Have someone help you get up.   6. If you have nausea (feel sick to your stomach): Drink only clear liquids such as apple juice, ginger ale, broth or 7-Up.  Rest may also help.  Be sure to drink enough fluids.  Move to a regular diet as you feel able.   7. You may have a slight fever.  Call the doctor if your fever is over 100 F (37.7 C) (taken under the tongue) or lasts longer than 24 hours.  8. You may have a dry mouth, a sore throat, muscle aches or trouble sleeping. These should go away after 24 hours.  9. Do not make important or legal decisions.               Tips for taking pain medications  To get the best pain relief possible, remember these points:    Take pain medications as directed, before pain becomes severe.    Pain medication can upset your stomach: taking it with food may help.    Constipation is a common side effect of pain medication. Drink plenty of  fluids.    Eat foods high in fiber. Take a stool softener if recommended by your doctor or pharmacist.    Do not drink alcohol, drive or operate machinery while taking pain medications.    Ask about other ways to control pain, such as with heat, ice or relaxation.    Tylenol/Acetaminophen Consumption  To help encourage the safe use of acetaminophen, the makers of TYLENOL  have lowered the maximum daily dose for single-ingredient Extra Strength TYLENOL  (acetaminophen) products sold in the [place] from 8  pills per day (4,000 mg) to 6 pills per day (3,000 mg). The dosing interval has also changed from 2 pills every 4-6 hours to 2 pills every 6 hours.    If you feel your pain relief is insufficient, you may take Tylenol/Acetaminophen in addition to your narcotic pain medication.     Be careful not to exceed 3,000 mg of Tylenol/Acetaminophen in a 24 hour period from all sources.    If you are taking extra strength Tylenol/acetaminophen (500 mg), the maximum dose is 6 tablets in 24 hours.    If you are taking regular strength acetaminophen (325 mg), the maximum dose is 9 tablets in 24 hours.    Call a doctor for any of the followin. Signs of infection (fever, growing tenderness at the surgery site, a large amount of drainage or bleeding, severe pain, foul-smelling drainage, redness, swelling).  2. It has been over 8 to 10 hours since surgery and you are still not able to urinate (pass water).  3. Headache for over 24 hours.  4. Numbness, tingling or weakness the day after surgery (if you had spinal anesthesia).  Your doctor is:  Dr. Chito Anderson, Prostate and Urology: [phone]                    Or dial [phone] and ask for the resident on call for:  Prostate Urology  For emergency care, call the:  [place] [facility]:  [phone] (TTY for hearing impaired: [phone])        Care of Indwelling Lares Catheter  Cleanliness is VERY important!  1. Wash well around catheter with soap and water and rinse well.  Do this every morning and before bedtime.  2. Empty leg bag or bed bag into toilet whenever it becomes half full.  3. When disconnecting and reconnecting, wipe both the catheter end and tubing tip with alcohol. (You may use commercially prepared alcohol wipes or regular cotton balls soaked in alcohol.)  4. Rinse leg bag and bed bag inside and out after each use. You can soak leg bag and/or bed bag in two to three ounces of white vinegar when not in use. Rinse thoroughly before reconnecting  to catheter.  5. You may clamp the catheter for short periods of time (two to three hours) if you are not uncomfortable. If planning intercourse: clamp catheter, disconnect from bag and   a) Tape catheter along shaft of penis, if male; or  b) Tape catheter to abdomen, if female  6. Drink lots of fluids (at least eight to ten cups/glasses per day) and take two to four grams of Vitamin C (optional) per day.      7. Watch for sign of catheter-associated urinary tract infection which include:      Cloudy urine, sediment in urine (may look like sand particles or white                           flakes), foul smelling urine    A burning feeling, pressure or pain in your lower abdomen    A burning feeling in the urethra or genitalia    Aching in the back (by the kidney)     How to Remove your Catheter    1. Wash your hands.    2. Insert the syringe into balloon port of the catheter.    3. Withdraw all the fluid from the balloon. You may have to re-insert the syringe into the port additional times until no more fluid can be withdrawn.    4. Pull gently on the catheter. If no resistance, slowly withdraw.    5. Dispose of the catheter and the syringe.    6. Wash your hands.    7. Call your doctor if unable to urinate within 6 to 8 hours after catheter was removed or when uncomfortable and feel urge to urinate but cannot.

## 2017-08-11 NOTE — OR NURSING
Patient reported difficulty breathing upon arrival to  II. Stated it was difficult to take a deep breath. O2 sats at 97%, lung fields clear throughout. Stated he felt anxious.  Assessed by Dr. Garcia, 1mg Versed ordered and given. Stated he felt a little better. VSS.

## 2017-08-11 NOTE — IP AVS SNAPSHOT
MRN:[id number]                      After Visit Summary   8/11/2017    Alcides Velazquez    MRN: [id number]           Thank you!     Thank you for choosing [facility] for your care. Our goal is always to provide you with excellent care. Hearing back from our patients is one way we can continue to improve our services. Please take a few minutes to complete the written survey that you may receive in the mail after you visit with us. Thank you!        Patient Information     Date Of Birth          1980        About your hospital stay     You were admitted on:  August 11, 2017 You last received care in the:  [facility]    You were discharged on:  August 11, 2017       Who to Call     For medical emergencies, please call 911.  For non-urgent questions about your medical care, please call your primary care provider or clinic, [phone]  For questions related to your surgery, please call your surgery clinic        Attending Provider     Provider Specialty    Chito Cadet MD Urology       Primary Care Provider Office Phone # Fax #    Ismael Yan [phone]      Your next 10 appointments already scheduled     Sep 11, 2017 10:00 AM CDT   (Arrive by 9:45 AM)   Post-Op with Bib Payton MD   [facility] ([facility])    [address] [place] [id number] [phone]              Further instructions from your care team       [facility]  Home Care Following Anesthesia  For 24 hours after surgery:  1. Get plenty of rest.  A responsible adult must stay with you for at least 24 hours after you leave the surgery center.  2. Do not drive or use heavy equipment.  If you have weakness or tingling, don't drive or use heavy equipment until this feeling goes away.   3. Do not drink alcohol.   4. Avoid strenuous or risky activities.  Ask  for help when climbing stairs.  5. You may feel lightheaded.  IF so, sit for a few minutes before standing.  Have someone help you get up.   6. If you have nausea (feel sick to your stomach): Drink only clear liquids such as apple juice, ginger ale, broth or 7-Up.  Rest may also help.  Be sure to drink enough fluids.  Move to a regular diet as you feel able.   7. You may have a slight fever.  Call the doctor if your fever is over 100 F (37.7 C) (taken under the tongue) or lasts longer than 24 hours.  8. You may have a dry mouth, a sore throat, muscle aches or trouble sleeping. These should go away after 24 hours.  9. Do not make important or legal decisions.               Tips for taking pain medications  To get the best pain relief possible, remember these points:    Take pain medications as directed, before pain becomes severe.    Pain medication can upset your stomach: taking it with food may help.    Constipation is a common side effect of pain medication. Drink plenty of  fluids.    Eat foods high in fiber. Take a stool softener if recommended by your doctor or pharmacist.    Do not drink alcohol, drive or operate machinery while taking pain medications.    Ask about other ways to control pain, such as with heat, ice or relaxation.    Tylenol/Acetaminophen Consumption  To help encourage the safe use of acetaminophen, the makers of TYLENOL  have lowered the maximum daily dose for single-ingredient Extra Strength TYLENOL  (acetaminophen) products sold in the [place] from 8 pills per day (4,000 mg) to 6 pills per day (3,000 mg). The dosing interval has also changed from 2 pills every 4-6 hours to 2 pills every 6 hours.    If you feel your pain relief is insufficient, you may take Tylenol/Acetaminophen in addition to your narcotic pain medication.     Be careful not to exceed 3,000 mg of Tylenol/Acetaminophen in a 24 hour period from all sources.    If you are taking extra strength Tylenol/acetaminophen (500 mg), the  maximum dose is 6 tablets in 24 hours.    If you are taking regular strength acetaminophen (325 mg), the maximum dose is 9 tablets in 24 hours.    Call a doctor for any of the followin. Signs of infection (fever, growing tenderness at the surgery site, a large amount of drainage or bleeding, severe pain, foul-smelling drainage, redness, swelling).  2. It has been over 8 to 10 hours since surgery and you are still not able to urinate (pass water).  3. Headache for over 24 hours.  4. Numbness, tingling or weakness the day after surgery (if you had spinal anesthesia).  Your doctor is:  Dr. Chito Anderson, Prostate and Urology: [phone]                    Or dial [phone] and ask for the resident on call for:  Prostate Urology  For emergency care, call the:  [place] [facility]:  [phone] (TTY for hearing impaired: [phone])        Care of Indwelling Lares Catheter  Cleanliness is VERY important!  1. Wash well around catheter with soap and water and rinse well.  Do this every morning and before bedtime.  2. Empty leg bag or bed bag into toilet whenever it becomes half full.  3. When disconnecting and reconnecting, wipe both the catheter end and tubing tip with alcohol. (You may use commercially prepared alcohol wipes or regular cotton balls soaked in alcohol.)  4. Rinse leg bag and bed bag inside and out after each use. You can soak leg bag and/or bed bag in two to three ounces of white vinegar when not in use. Rinse thoroughly before reconnecting to catheter.  5. You may clamp the catheter for short periods of time (two to three hours) if you are not uncomfortable. If planning intercourse: clamp catheter, disconnect from bag and   a) Tape catheter along shaft of penis, if male; or  b) Tape catheter to abdomen, if female  6. Drink lots of fluids (at least eight to ten cups/glasses per day) and take two to four grams of Vitamin C (optional) per day.      7. Watch for sign of  "catheter-associated urinary tract infection which include:      Cloudy urine, sediment in urine (may look like sand particles or white                           flakes), foul smelling urine    A burning feeling, pressure or pain in your lower abdomen    A burning feeling in the urethra or genitalia    Aching in the back (by the kidney)     How to Remove your Catheter    1. Wash your hands.    2. Insert the syringe into balloon port of the catheter.    3. Withdraw all the fluid from the balloon. You may have to re-insert the syringe into the port additional times until no more fluid can be withdrawn.    4. Pull gently on the catheter. If no resistance, slowly withdraw.    5. Dispose of the catheter and the syringe.    6. Wash your hands.    7. Call your doctor if unable to urinate within 6 to 8 hours after catheter was removed or when uncomfortable and feel urge to urinate but cannot.            Pending Results     No orders found from 8/9/2017 to 8/12/2017.            Admission Information     Date & Time Provider Department Dept. Phone    8/11/2017 Chito Cadet MD [facility] [phone]      Your Vitals Were     Blood Pressure Temperature Respirations Height Weight Pulse Oximetry    110/68 97  F (36.1  C) (Temporal) 14 1.803 m (5' 11\") 110.2 kg (243 lb) 93%    BMI (Body Mass Index)                   33.89 kg/m2           [employer] Information     [employer] is an electronic gateway that provides easy, online access to your medical records. With [employer], you can request a clinic appointment, read your test results, renew a prescription or communicate with your care team.     To sign up for [employer] visit the website at [email or web address]   You will be asked to enter the access code listed below, as well as some personal information. Please follow the directions to create your username and password.     Your access code is: [email or web address]  Expires: 8/27/2017  6:31 AM     Your " access code will  in 90 days. If you need help or a new code, please contact your [facility] or call [phone] for assistance.        Care EveryWhere ID     This is your Care EveryWhere ID. This could be used by other organizations to access your [place] medical records  [id number]        Equal Access to Services     KRYSTAL GOULD : Haddayday hdz hadanamariao Soaniyahali, waaxda luqadaha, qaybta kaalmada canicolasda, selwyn georgejoelletheresa bautista . So [facility] [phone].    ATENCIÓN: Si habla español, tiene a cortes disposición servicios gratuitos de asistencia lingüística. Zeke al [phone].    We comply with applicable federal civil rights laws and [place] laws. We do not discriminate on the basis of race, color, national origin, age, disability sex, sexual orientation or gender identity.               Review of your medicines      START taking        Dose / Directions    levofloxacin 500 MG tablet   Commonly known as:  LEVAQUIN   Used for:  Urethral stricture, unspecified        Dose:  500 mg   Take 1 tablet (500 mg) by mouth daily   Quantity:  4 tablet   Refills:  0         CONTINUE these medicines which have NOT CHANGED        Dose / Directions    ALPRAZolam 0.5 MG tablet   Commonly known as:  XANAX        Dose:  0.5 mg   Take 0.5 mg by mouth daily as needed   Refills:  0       FLUoxetine 40 MG capsule   Commonly known as:  PROzac        Dose:  40 mg   Take 40 mg by mouth daily   Refills:  0       melatonin 5 MG Caps        Dose:  5 mg   Take 5 mg by mouth daily   Refills:  0       naproxen sodium 220 MG tablet   Commonly known as:  ANAPROX        Dose:  220 mg   Take 220 mg by mouth daily   Refills:  0       NIFEdipine ER osmotic 30 MG 24 hr tablet   Commonly known as:  PROCARDIA XL        Dose:  30 mg   Take 30 mg by mouth daily   Refills:  0       polyethylene glycol powder   Commonly known as:  MIRALAX/GLYCOLAX        MIX 17 GRAMS (1 CAPFUL) IN LIQUID AND DRINK  TWICE DAILY   Refills:  0       tamsulosin 0.4 MG capsule   Commonly known as:  FLOMAX        Dose:  0.4 mg   Take 0.4 mg by mouth daily   Refills:  0       traMADol 50 MG tablet   Commonly known as:  ULTRAM        Dose:   mg   Take  mg by mouth nightly as needed   Refills:  0            Where to get your medicines      These medications were sent to [place], [place] - [address] [facility] [address]  [address] [facility] [address], [facility] [address]    Hours:  TRANSPLANT PHONE NUMBER [phone] Phone:  [phone]     levofloxacin 500 MG tablet                Protect others around you: Learn how to safely use, store and throw away your medicines at www.disposemymeds.org.             Medication List: This is a list of all your medications and when to take them. Check marks below indicate your daily home schedule. Keep this list as a reference.      Medications           Morning Afternoon Evening Bedtime As Needed    ALPRAZolam 0.5 MG tablet   Commonly known as:  XANAX   Take 0.5 mg by mouth daily as needed                                FLUoxetine 40 MG capsule   Commonly known as:  PROzac   Take 40 mg by mouth daily                                levofloxacin 500 MG tablet   Commonly known as:  LEVAQUIN   Take 1 tablet (500 mg) by mouth daily                                melatonin 5 MG Caps   Take 5 mg by mouth daily                                naproxen sodium 220 MG tablet   Commonly known as:  ANAPROX   Take 220 mg by mouth daily                                NIFEdipine ER osmotic 30 MG 24 hr tablet   Commonly known as:  PROCARDIA XL   Take 30 mg by mouth daily                                polyethylene glycol powder   Commonly known as:  MIRALAX/GLYCOLAX   MIX 17 GRAMS (1 CAPFUL) IN LIQUID AND DRINK TWICE DAILY                                tamsulosin 0.4 MG capsule   Commonly known as:  FLOMAX   Take 0.4 mg by mouth daily                                 traMADol 50 MG tablet   Commonly known as:  ULTRAM   Take  mg by mouth nightly as needed                                          More Information        Lares Catheter Removal     The syringe is put into the balloon port to allow the balloon to empty. Once the balloon is empty, the catheter can be removed.     Your healthcare provider has instructed you to remove your Lares catheter. This is a thin, flexible tube that allows urine to drain out of your bladder and into a bag. It s important to properly remove your catheter to help prevent infection and other complications. If you have any questions about removing the Lares catheter, ask your healthcare provider before attempting to remove it. Otherwise, follow the instructions on this sheet.  Lares catheter  The Lares catheter is held in place by a small balloon that s filled with water. To remove the catheter, you must first drain the water from the balloon. This is done using a syringe and the balloon port. This is the opening in the catheter that isn t attached to the bag. It allows you to access the balloon.  Instructions for catheter removal  Follow the directions closely. Note: If the catheter doesn t come out with gentle pulling, stop and call your healthcare provider right away.    Empty the bag of urine if needed.    Wash your hands with soap and warm water. Dry them well.    Gather your supplies. This includes a syringe, wastebasket, a towel, and a syringe that was provided to you by your healthcare provider.    Insert the syringe into the balloon port on the catheter. The syringe fits tightly into the port with a firm push and twist motion.    Wait as the water from the balloon empties into the syringe.    Once the balloon is emptied, gently pull out the catheter.    Put the used catheter in the wastebasket. Also throw away the syringe.    Use the towel to wipe up any spilled water or urine if necessary.    Wash your hands again.  When to call your  healthcare provider  Call the healthcare provider right away if:    You have a fever of 100.4 F (38 C) or higher.    You have questions about catheter removal.    The catheter doesn t come out with gentle pulling.    You can t urinate within 8 hours of catheter removal.    Your abdomen is painful or bloated.    You have burning pain with urination that lasts for 24 hours.    You see a lot of blood in the urine (light bleeding for 24 hours is normal).    It feels like the bladder is not emptying.   Date Last Reviewed: 12/1/2016 2000-2017 [employer]. [address] [place] [address]. All rights reserved. This information is not intended as a substitute for professional medical care. Always follow your healthcare professional's instructions.                Discharge Instructions: Caring for Your Leg Bag  You are going home with a urinary catheter and collection device (drainage bag) in place. One type of collection device is called a leg bag. This is a smaller drainage bag that you can wear on your leg to collect urine during the day. The bag can fit under your clothing. You can move around with greater ease when using a leg bag instead of a larger collection bag.  You were shown how to care for your catheter in the hospital. This sheet will help you remember those steps when you are at home.  Home care    Wash your hands thoroughly before and after you care for your catheter or collection device.    Gather your supplies:    Alcohol wipes    Soap and water    Towel and washcloth    Leg strap and leg bag    Use soap and water to wash the area where your catheter enters your body. Rinse well.    Secure the bag padron to your leg:    Position the leg band high on your thigh with the product label pointing away from your leg.    Stretch the leg band in place and fasten.    Place the catheter tubing over the bag and secure it. You may secure it with a Velcro tab or other method, depending on the  product you use. Be sure to leave enough loop in the catheter above the leg band to avoid pulling on the tube.    Every 4 to 6 hours, reposition the band to prevent pressure from the elastic on your leg. You can do this by changing the bag to the other leg or by raising or lowering the leg band.    Wash the band as frequently as needed. You can hand wash and dry the leg band.    Place the bag in the bag padron.    Clean the urine bag end of the catheter and your catheter port with an alcohol wipe.    Place a towel under the bag and port to keep urine from dripping onto your leg.    Before connecting the outlet valve at the bottom of the bag to the catheter, make sure that it is firmly closed. Flip the valve upward toward the bag; it needs to snap firmly in place. Be sure not to tug on the tubing. Be gentle.    Attach the urine bag to the end of the catheter; insert the connector snugly into the catheter port. You can avoid dribbling urine by bending the catheter tubing just below the tip and holding it while you disconnect it from the catheter. Be careful to keep the tip clean while connecting the leg bag tubing to the catheter--this keeps germs from getting into the system.    Drain the bag when it is full. To drain the bag, flip the clamp downward. Direct the flexible outlet tube to control the flow of urine. You don t have to disconnect the leg bag from the catheter to empty it. Raise your leg up to the edge of the toilet to reach the leg bag. Then you can empty the bag directly into the toilet. This way, you won t need to bend over, which may be uncomfortable.    Keep the leg bag clean. Rinse daily with equal parts water and vinegar to reduce odor and keep the bag free of germs.    Remember to keep the drainage bag below the level of your bladder for proper drainage.  Follow-up  Make a follow-up appointment as directed by your healthcare provider.  When to call your healthcare provider  Call your healthcare  provider right away if you have any of the following:    Redness, swelling, or warmth around the catheter entry site    Pus draining from your catheter entry site or into the catheter tubing and bag    Blood, clots, or floating debris in the urine    Nausea and vomiting    Shaking chills    Fever above 100.4 F (38 C)    Pain that is not relieved by medicine     Catheter that falls out or is dislodged   Date Last Reviewed: 1/1/2017 2000-2017 [employer]. [address] [place] [address]. All rights reserved. This information is not intended as a substitute for professional medical care. Always follow your healthcare professional's instructions.

## 2017-08-11 NOTE — OP NOTE
Preoperative Diagnosis: urethral stricture     Postoperative Diagnosis: urethral stricture (length = 1 cm; location = 1 cm distal to the external sphincter)    Surgeon: Dr. 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 However, previous UDS in [place] in 3/2017 demonstrated a high pressure (PDet of 75 cm H2O)/low flow (Qmax 6.4 mL/s) scenario with no DSD.     He understands the risks to include but not be limited to bleeding, infection, the need for additional procedures and recurrence of primary disease. He wishes to proceed.    Description of Procedure: After informed consent and pre-operative antibiotics were given the patient was taken to the operating room where general anesthesia was delivered. He was prepped and draped in the lithotomy position with all pressure points well-padded.    A urethrotomy scope was inserted through a well-lubricated urethra up to the point of the stricture which was located in the BULBAR urethra. A guidewire was placed into the bladder. Radial cuts were then made with a straight blade until the caliber of the urethra was approximately 24F and the base of the incisions appeared healthy and bleeding.    The scope was advanced into the bladder where no stones or tumors were seen. A 20F catheter was placed over a guidewire.    The patient was awakened from anesthesia  and taken to the recovery room in stable condition.    HE WILL BE discharged with cisse catheter for 3 days. To return to clinic in 1 month for a uroflow.

## 2017-08-11 NOTE — ANESTHESIA CARE TRANSFER NOTE
Patient: Alcides Velazquez    Procedure(s):  Cystoscopy, Direct Visual Interal Uretherotomy - Wound Class: II-Clean Contaminated    Diagnosis: Bladder Neck Obstruction  Diagnosis Additional Information: No value filed.    Anesthesia Type:   General, ETT     Note:  Airway :Room Air  Patient transferred to:PACU  Comments: To pacu on room air vss report to RN    103/70, 15, 87, 94%      Vitals: (Last set prior to Anesthesia Care Transfer)    CRNA VITALS  8/11/2017 1401 - 8/11/2017 1443      8/11/2017             Pulse: 62    SpO2: 96 %    Resp Rate (observed): (!)  6    Resp Rate (set): 10                Electronically Signed By: EDDI Rangel CRNA  August 11, 2017  2:43 PM

## 2017-08-11 NOTE — ANESTHESIA PREPROCEDURE EVALUATION
Alcides Velazquez is a 37 year old male with a PMH of  Bladder Neck Obstruction who is scheduled for Procedure(s):  Transurethral Incision of the Prostate - Wound Class: II-Clean Contaminated    NPO Status: Adequate.  > 6 hours solids, > 2 hours clear liquids.       Past Surgical History:   Procedure Laterality Date     CYSTOSCOPY      presures were high       Anesthesia Evaluation     . Pt has had prior anesthetic. Type: General    History of anesthetic complications (anxiety on wake up)   - PONV        ROS/MED HX    ENT/Pulmonary:      (-) tobacco use, asthma and COPD   Neurologic:     (+)other neuro (GBS vs cauda equina/spinal cord infarct. LE neurapathy and weakness)    (-) CVA, TIA and Neuropathy   Cardiovascular:     (+) hypertension----. : . . . :. . Previous cardiac testing date:results:Stress Testdate: results:10/16/2015 12:00 AM CDT  [facility]  [place], [place]    PHARMACOLOGIC MYOCARDIAL PERFUSION SCAN    Supervising provider: Robert Zee MD,    INDICATION: Syncope and left bundle branch block.     MEDICATIONS: Prozac, melatonin, Aleve.     RESTING EKG: Heart rate at rest is 67 with left bundle branch block.     NUCLEAR PROTOCOL:  LEXISCAN:  0.4 mg.   TECHNETIUM SESTAMIBI:  10.6 mCi for the rest injection.   TECHNETIUM SESTAMIBI:  28.2 mCi for the stress injection.    RESULTS:  EKG during the Lexiscan injection shows the heart rate increased up to 110.  No diagnostic changes on EKG. Left bundle branch block persists.     PERFUSION IMAGES:  Myocardial perfusion images were obtained.  The images were compared at rest versus stress.  No reversible defect for ischemia.  Mid septal fixed defect likely related to the bundle branch block.      GATED WALL MOTION ANALYSIS:  Left ventricular ejection fraction 44%. Septal wall motion abnormality noted.       FINAL IMPRESSION:  1. Lexiscan stress myocardial perfusion imaging. Adequate heart rate response.  No  diagnostic changes on EKG with left bundle branch block noted at rest and on the Lexiscan injection.     2. Myocardial perfusion imaging with no reversible defect for ischemia. An area of perfusion defect in the mid septal segment likely secondary to bundle branch block artifact.    3. Gated wall motion analysis with left ventricular ejection fraction of 44% with septal hypokinesis.  TID score is 0.93.  ECG reviewed date: results:NSR, LBBB date: results:         (-) CAD, irregular heartbeat/palpitations and stent   METS/Exercise Tolerance:     Hematologic:        (-) anemia   Musculoskeletal:         GI/Hepatic:        (-) GERD and liver disease   Renal/Genitourinary:      (-) renal disease   Endo:     (+) Obesity, .   (-) Type I DM, Type II DM and thyroid disease   Psychiatric:         Infectious Disease:  - neg infectious disease ROS       Malignancy:         Other:                     Physical Exam  Normal systems: cardiovascular, pulmonary and dental    Airway   Mallampati: II  TM distance: >3 FB  Neck ROM: full    Dental     Cardiovascular   Rhythm and rate: regular and normal      Pulmonary    breath sounds clear to auscultation                    Anesthesia Plan      History & Physical Review  History and physical reviewed and following examination; no interval change.    ASA Status:  2 .        Plan for General and ETT with Intravenous induction. Maintenance will be TIVA.    PONV prophylaxis:  Ondansetron (or other 5HT-3)       Postoperative Care      Consents  Anesthetic plan, risks, benefits and alternatives discussed with:  Patient..        Rolando Garcia MD  12:38 PM August 11, 2017                       .

## 2017-08-11 NOTE — IP AVS SNAPSHOT
[facility]    [address] [place] [address]    Phone:  [phone]    Fax:  [phone]                                       After Visit Summary   8/11/2017    Alcides Velazquez    MRN: [id number]           After Visit Summary Signature Page     I have received my discharge instructions, and my questions have been answered. I have discussed any challenges I see with this plan with the nurse or doctor.    ..........................................................................................................................................  Patient/Patient Representative Signature      ..........................................................................................................................................  Patient Representative Print Name and Relationship to Patient    ..................................................               ................................................  Date                                            Time    ..........................................................................................................................................  Reviewed by Signature/Title    ...................................................              ..............................................  Date                                                            Time

## 2017-08-11 NOTE — ANESTHESIA POSTPROCEDURE EVALUATION
Patient: Alcides Velazquez    Procedure(s):  Cystoscopy, Direct Visual Interal Uretherotomy - Wound Class: II-Clean Contaminated    Diagnosis:Bladder Neck Obstruction  Diagnosis Additional Information: No value filed.    Anesthesia Type:  General, ETT    Note:  Anesthesia Post Evaluation    Patient location during evaluation: Phase 2  Patient participation: Able to fully participate in evaluation  Level of consciousness: awake and alert  Pain management: adequate  Airway patency: patent  Cardiovascular status: acceptable  Respiratory status: acceptable  Hydration status: acceptable  PONV: none     Anesthetic complications: None    Comments: Felt like it was difficult to take a deep breath in Phase II, but satting upper 90s on RA, takes during breaths during auscultation, CTAB. Having some anxiety, improved with midazolam 1 mg.        Last vitals:  Vitals:    08/11/17 1530 08/11/17 1545 08/11/17 1600   BP: 125/82 118/72 128/75   Resp: 16 16 16   Temp:   36.1  C (97  F)   SpO2: 96% 95% 97%         Electronically Signed By: Rolando Garcia MD  August 11, 2017  4:18 PM

## 2017-08-29 ENCOUNTER — PRE VISIT (OUTPATIENT)
Dept: UROLOGY | Facility: CLINIC | Age: 37
End: 2017-08-29

## 2017-08-29 NOTE — TELEPHONE ENCOUNTER
Patient with history of urethral stricture coming in for post operative check up S/P cystoscopy and direct vision internal urethrotomy. Patient chart reviewed, no need for call, all records available and ready for appointment.

## 2017-09-11 ENCOUNTER — ALLIED HEALTH/NURSE VISIT (OUTPATIENT)
Dept: UROLOGY | Facility: CLINIC | Age: 37
End: 2017-09-11

## 2017-09-11 ENCOUNTER — OFFICE VISIT (OUTPATIENT)
Dept: UROLOGY | Facility: CLINIC | Age: 37
End: 2017-09-11

## 2017-09-11 VITALS
DIASTOLIC BLOOD PRESSURE: 99 MMHG | HEART RATE: 70 BPM | HEIGHT: 71 IN | SYSTOLIC BLOOD PRESSURE: 160 MMHG | BODY MASS INDEX: 34.02 KG/M2 | WEIGHT: 243 LBS

## 2017-09-11 DIAGNOSIS — N32.0 BLADDER NECK OBSTRUCTION: Primary | ICD-10-CM

## 2017-09-11 RX ORDER — CEFAZOLIN SODIUM 1 G/3ML
1 INJECTION, POWDER, FOR SOLUTION INTRAMUSCULAR; INTRAVENOUS SEE ADMIN INSTRUCTIONS
Status: CANCELLED | OUTPATIENT
Start: 2017-09-11

## 2017-09-11 RX ORDER — CHLORHEXIDINE GLUCONATE 40 MG/ML
SOLUTION TOPICAL ONCE
Status: CANCELLED | OUTPATIENT
Start: 2017-09-11 | End: 2017-09-11

## 2017-09-11 ASSESSMENT — PAIN SCALES - GENERAL: PAINLEVEL: NO PAIN (1)

## 2017-09-11 NOTE — LETTER
"9/11/2017       RE: Alcides Velazquez  [address]  [place] [address]     Dear Colleague,    Thank you for referring your patient, Alcides Velazquez, to the [facility] UROLOGY AND INST FOR PROSTATE AND UROLOGIC CANCERS at [facility]. Please see a copy of my visit note below.    Post op visit.     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    His uroflow is superb today after the DVIU and says that he has a great stream, but his other symptoms are still persisting.     Urinary Flow Rate  Peak Flow: 18.7 mL/s  Average Flow: 10.2 mL/s  Voided (mL): 406 mL  Residual Volume by Ultrasound: 109 mL  Character of Curve: Plateau    BP (!) 160/99  Pulse 70  Ht 1.803 m (5' 11\")  Wt 110.2 kg (243 lb)  BMI 33.89 kg/m2  abd soft  No dysuria  Benign exam.    Plan:   1- I gave him ample instructions on timed voiding  2- he wants to repeat UDS (previous UDS was unsuccessful due to stricture(        Again, thank you for allowing me to participate in the care of your patient.      Sincerely,    Bib Payton MD      "

## 2017-09-11 NOTE — MR AVS SNAPSHOT
After Visit Summary   2017    Alcides Velazquez    MRN: [id number]           Patient Information     Date Of Birth          1980        Visit Information        Provider Department      2017 12:00 PM Nurse,  [facility] for Prostate and Urologic Cancers        Today's Diagnoses     Bladder neck obstruction    -  1       Follow-ups after your visit        Your next 10 appointments already scheduled     Sep 11, 2017 12:00 PM CDT   (Arrive by 11:45 AM)   Return Visit with  [facility] Nurse   [facility] for Prostate and Urologic Cancers ([facility] and [facility] [place])    [address] [address] [phone]              Who to contact     Please call your clinic at [phone] to:    Ask questions about your health    Make or cancel appointments    Discuss your medicines    Learn about your test results    Speak to your doctor   If you have compliments or concerns about an experience at your clinic, or if you wish to file a complaint, please contact [facility] Phys[facility] Patient Relations at [phone] or email us at [email or web address]         Additional Information About Your Visit        MyChart Information     Ryonett is an electronic gateway that provides easy, online access to your medical records. With [employer], you can request a clinic appointment, read your test results, renew a prescription or communicate with your care team.     To sign up for [employer]onett visit the website at [email or web address]   You will be asked to enter the access code listed below, as well as some personal information. Please follow the directions to create your username and password.     Your access code is: [email or web address]  Expires: 2017  6:30 AM     Your access code will  in 90 days. If you need help or a new code, please contact your [facility]  Physicians Clinic or call [phone] for assistance.        Care EveryWhere ID     This is your Care EveryWhere ID. This could be used by other organizations to access your [place] medical records  [id number]         Blood Pressure from Last 3 Encounters:   09/11/17 (!) 160/99   08/11/17 128/75   07/19/17 132/80    Weight from Last 3 Encounters:   09/11/17 110.2 kg (243 lb)   08/11/17 110.2 kg (243 lb)   06/29/17 110.6 kg (243 lb 12.8 oz)              Today, you had the following     No orders found for display       Primary Care Provider Office Phone # Fax #    Ismael Yan [phone]       [facility] [address]  [place] [address]        Equal Access to Services     KRYSTAL GOULD : Hadii abhi robleso Sodavid, waaxda luqadaha, qaybta kaalmada geneva, selwyn bautista . So [facility] [phone].    ATENCIÓN: Si habla español, tiene a cortes disposición servicios gratuitos de asistencia lingüística. Zeke al [phone].    We comply with applicable federal civil rights laws and [place] laws. We do not discriminate on the basis of race, color, national origin, age, disability sex, sexual orientation or gender identity.            Thank you!     Thank you for choosing [facility] UROLOGY AND [facility] FOR PROSTATE AND UROLOGIC CANCERS  for your care. Our goal is always to provide you with excellent care. Hearing back from our patients is one way we can continue to improve our services. Please take a few minutes to complete the written survey that you may receive in the mail after your visit with us. Thank you!             Your Updated Medication List - Protect others around you: Learn how to safely use, store and throw away your medicines at www.disposemymeds.org.          This list is accurate as of: 9/11/17 11:56 AM.  Always use your most recent med list.                   Brand Name Dispense Instructions for use Diagnosis    ALPRAZolam 0.5 MG tablet    XANAX      Take 0.5 mg by mouth daily as needed        FLUoxetine 40 MG capsule    PROzac     Take 40 mg by mouth daily        levofloxacin 500 MG tablet    LEVAQUIN    4 tablet    Take 1 tablet (500 mg) by mouth daily    Urethral stricture, unspecified       melatonin 5 MG Caps      Take 5 mg by mouth daily        naproxen sodium 220 MG tablet    ANAPROX     Take 220 mg by mouth daily        NIFEdipine ER osmotic 30 MG 24 hr tablet    PROCARDIA XL     Take 30 mg by mouth daily        polyethylene glycol powder    MIRALAX/GLYCOLAX     MIX 17 GRAMS (1 CAPFUL) IN LIQUID AND DRINK TWICE DAILY        tamsulosin 0.4 MG capsule    FLOMAX     Take 0.4 mg by mouth daily        traMADol 50 MG tablet    ULTRAM     Take  mg by mouth nightly as needed

## 2017-09-11 NOTE — PROGRESS NOTES
"Post op visit.     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    His uroflow is superb today after the DVIU and says that he has a great stream, but his other symptoms are still persisting.     Urinary Flow Rate  Peak Flow: 18.7 mL/s  Average Flow: 10.2 mL/s  Voided (mL): 406 mL  Residual Volume by Ultrasound: 109 mL  Character of Curve: Plateau    BP (!) 160/99  Pulse 70  Ht 1.803 m (5' 11\")  Wt 110.2 kg (243 lb)  BMI 33.89 kg/m2  abd soft  No dysuria  Benign exam.    Plan:   1- I gave him ample instructions on timed voiding  2- he wants to repeat UDS (previous UDS was unsuccessful due to stricture(      "

## 2017-09-11 NOTE — PROGRESS NOTES
1 month s/p DVIU for uerthral stricture.    He had Guillan-Ringoes and slow stream and hesitancy. UDS showed BNO. We went in to do TUIBN last month but found a urethral stricture so we opened that and then reassessed today. He says things are only slightly better. He is still very bothered by slow stream, hesitancy, sense of incomplete voiding and overflow incontinence. He would like to proceed with TUIBN. He understands urethral stricture can recur in the future.    He understands there will be retrograde ejaculation.

## 2017-09-11 NOTE — PATIENT INSTRUCTIONS
Urodynamic Testing  What You Need to Know  What is urodynamic testing?  Urodynamic testing is the best way for your doctor to look at the function of your bladder. It is like an EKG, which is used to look at the function of your heart.  The test takes between 60 and 90 minutes. You will spend about 11/2 to 2 hours in your doctor s office. For most patients, the test is not painful.  How should I get ready for this test?   Arrive with a full bladder, if you are able. (Try to drink six 8-ounce glasses of liquid one hour before your exam.)    You may want to wear loose clothing.   Make sure your skin below the waist is clean and dry (no lotions or powders).   If you have any of the following symptoms before the test, please call the clinic right away:Having to urinate more often or feeling a sudden urge to go   Feeling pain or burning when you urinate   Fever or chills   Smelly or cloudy urine      If we gave you a bladder diary, please complete and bring it with you.    What happens during this test?  1. You will empty your bladder into a special toilet. The toilet measures your rate of urine flow.  2. We will then place a very small tube (catheter) into your bladder. This drains any urine that is left. We will measure the amount of urine.  3. We will place a small tube in your rectum. We ll also put a tiny patch of electrodes on the skin near the anus.  4. A computer will measure the pressure in your bladder and how well your sphincter is working. (The sphincter is the muscle that controls the bladder.)  5. Your bladder will slowly fill with a mixture of saline and contrast medium. We will take images, similar to an Xray. You will tell us when your bladder feels a bit full, quite full and completely full.  6. We will ask you to bear down several times. As you do, we will check for leaking urine.  7. You will again empty your bladder into the special toilet.    What happens after the test?  Your doctor will share  the results on the day of the exam, or soon after.  After your test, you may go about your day as normal. You may notice some blood in your urine. You may feel a more urgent need to use the toilet, or you may need to go more often. These effects should improve in the next few days.

## 2017-09-11 NOTE — NURSING NOTE
"Chief Complaint   Patient presents with     Surgical Followup     post operative check up S/P cystoscopy and direct vision internal urethrotomy       Blood pressure (!) 160/99, pulse 70, height 1.803 m (5' 11\"), weight 110.2 kg (243 lb). Body mass index is 33.89 kg/(m^2).    Patient Active Problem List   Diagnosis     Anxiety     Choking     Constipation     Corn     Difficulty in urination     Dysphagia     Essential hypertension     Guillain-[place] syndrome (H)     Class 1 obesity     Low back pain with sciatica     Snoring     Syncope     Syncope and collapse     Retention of urine     Vasovagal syncope     Acute infective polyneuritis (H)     Neurogenic bladder       Allergies   Allergen Reactions     Influenza Vaccines      Other reaction(s): Other, see comments  Possibly caused Seble Pepper       Current Outpatient Prescriptions   Medication Sig Dispense Refill     levofloxacin (LEVAQUIN) 500 MG tablet Take 1 tablet (500 mg) by mouth daily 4 tablet 0     ALPRAZolam (XANAX) 0.5 MG tablet Take 0.5 mg by mouth daily as needed       FLUoxetine (PROZAC) 40 MG capsule Take 40 mg by mouth daily       melatonin 5 MG CAPS Take 5 mg by mouth daily       naproxen sodium (ANAPROX) 220 MG tablet Take 220 mg by mouth daily       NIFEdipine ER osmotic (PROCARDIA XL) 30 MG 24 hr tablet Take 30 mg by mouth daily       polyethylene glycol (MIRALAX/GLYCOLAX) powder MIX 17 GRAMS (1 CAPFUL) IN LIQUID AND DRINK TWICE DAILY       tamsulosin (FLOMAX) 0.4 MG capsule Take 0.4 mg by mouth daily       traMADol (ULTRAM) 50 MG tablet Take  mg by mouth nightly as needed         Social History   Substance Use Topics     Smoking status: Never Smoker     Smokeless tobacco: Former User     Types: Chew     Alcohol use Yes       Dayna Hopkins LPN  9/11/2017  10:22 AM    "

## 2017-09-11 NOTE — MR AVS SNAPSHOT
After Visit Summary   9/11/2017    Alcides Velazquez    MRN: [id number]           Patient Information     Date Of Birth          1980        Visit Information        Provider Department      9/11/2017 10:00 AM Bib Payton MD [facility] Urology and [facility] Urologic [facility]        Care Instructions       Urodynamic Testing  What You Need to Know  What is urodynamic testing?  Urodynamic testing is the best way for your doctor to look at the function of your bladder. It is like an EKG, which is used to look at the function of your heart.  The test takes between 60 and 90 minutes. You will spend about 11/2 to 2 hours in your doctor s office. For most patients, the test is not painful.  How should I get ready for this test?   Arrive with a full bladder, if you are able. (Try to drink six 8-ounce glasses of liquid one hour before your exam.)    You may want to wear loose clothing.   Make sure your skin below the waist is clean and dry (no lotions or powders).   If you have any of the following symptoms before the test, please call the clinic right away:Having to urinate more often or feeling a sudden urge to go   Feeling pain or burning when you urinate   Fever or chills   Smelly or cloudy urine      If we gave you a bladder diary, please complete and bring it with you.    What happens during this test?  1. You will empty your bladder into a special toilet. The toilet measures your rate of urine flow.  2. We will then place a very small tube (catheter) into your bladder. This drains any urine that is left. We will measure the amount of urine.  3. We will place a small tube in your rectum. We ll also put a tiny patch of electrodes on the skin near the anus.  4. A computer will measure the pressure in your bladder and how well your sphincter is working. (The sphincter is the muscle that controls the bladder.)  5. Your bladder will slowly fill with a mixture of saline and contrast medium. We will  take images, similar to an Xray. You will tell us when your bladder feels a bit full, quite full and completely full.  6. We will ask you to bear down several times. As you do, we will check for leaking urine.  7. You will again empty your bladder into the special toilet.    What happens after the test?  Your doctor will share the results on the day of the exam, or soon after.  After your test, you may go about your day as normal. You may notice some blood in your urine. You may feel a more urgent need to use the toilet, or you may need to go more often. These effects should improve in the next few days.            Follow-ups after your visit        Your next 10 appointments already scheduled     Oct 05, 2017  3:00 PM CDT   (Arrive by 2:45 PM)   Urodynamics with Lilliana Avalos PA-C   [facility] Urology and [facility] for Prostate and Urologic Cancers ([facility])    [address] [place] [id number] [phone]              Who to contact     Please call your clinic at [phone] to:    Ask questions about your health    Make or cancel appointments    Discuss your medicines    Learn about your test results    Speak to your doctor   If you have compliments or concerns about an experience at your clinic, or if you wish to file a complaint, please contact [facility] Patient Relations at [phone] or email us at [email or web address]         Additional Information About Your Visit        Gen[employer] Information     [employer] is an electronic gateway that provides easy, online access to your medical records. With [employer], you can request a clinic appointment, read your test results, renew a prescription or communicate with your care team.     To sign up for [employer] visit the website at [email or web address]   You will be asked to enter the access code listed below, as well as some personal information. Please follow the  "directions to create your username and password.     Your access code is: [email or web address]  Expires: 2017  6:30 AM     Your access code will  in 90 days. If you need help or a new code, please contact your [facility] or call [phone] for assistance.        Care EveryWhere ID     This is your Care EveryWhere ID. This could be used by other organizations to access your [place] medical records  [id number]        Your Vitals Were     Pulse Height BMI (Body Mass Index)             70 1.803 m (5' 11\") 33.89 kg/m2          Blood Pressure from Last 3 Encounters:   17 (!) 160/99   17 128/75   17 132/80    Weight from Last 3 Encounters:   17 110.2 kg (243 lb)   17 110.2 kg (243 lb)   17 110.6 kg (243 lb 12.8 oz)              Today, you had the following     No orders found for display       Primary Care Provider Office Phone # Fax #    Ismael Yan [phone]       [facility] [address] [address]        Equal Access to Services     KRYSTAL GOULD : Hadii abhi ku hadasho Soaniyahali, waaxda luqadaha, qaybta kaalmada adeegyada, selwyn bautista . So [facility] [phone].    ATENCIÓN: Si habla español, tiene a cortes disposición servicios gratuitos de asistencia lingüística. Llame al [phone].    We comply with applicable federal civil rights laws and [place] laws. We do not discriminate on the basis of race, color, national origin, age, disability sex, sexual orientation or gender identity.            Thank you!     Thank you for choosing [facility] UROLOGY AND [facility] FOR PROSTATE AND UROLOGIC CANCERS  for your care. Our goal is always to provide you with excellent care. Hearing back from our patients is one way we can continue to improve our services. Please take a few minutes to complete the written survey that you may receive in the mail after your visit with us. Thank you!           "   Your Updated Medication List - Protect others around you: Learn how to safely use, store and throw away your medicines at www.disposemymeds.org.          This list is accurate as of: 9/11/17 11:05 AM.  Always use your most recent med list.                   Brand Name Dispense Instructions for use Diagnosis    ALPRAZolam 0.5 MG tablet    XANAX     Take 0.5 mg by mouth daily as needed        FLUoxetine 40 MG capsule    PROzac     Take 40 mg by mouth daily        levofloxacin 500 MG tablet    LEVAQUIN    4 tablet    Take 1 tablet (500 mg) by mouth daily    Urethral stricture, unspecified       melatonin 5 MG Caps      Take 5 mg by mouth daily        naproxen sodium 220 MG tablet    ANAPROX     Take 220 mg by mouth daily        NIFEdipine ER osmotic 30 MG 24 hr tablet    PROCARDIA XL     Take 30 mg by mouth daily        polyethylene glycol powder    MIRALAX/GLYCOLAX     MIX 17 GRAMS (1 CAPFUL) IN LIQUID AND DRINK TWICE DAILY        tamsulosin 0.4 MG capsule    FLOMAX     Take 0.4 mg by mouth daily        traMADol 50 MG tablet    ULTRAM     Take  mg by mouth nightly as needed

## 2017-09-11 NOTE — PROGRESS NOTES
Pre Op Teaching Flowsheet       Pre and Post op Patient Education  Relevant Diagnosis:  Bladder neck obstruction  Teaching Topic:  Pre and post op teaching for transurethral incision of bladder neck  Person Involved in teaching:  Alcides Velazquez      Motivation Level:  Asks Questions: Yes  Eager to Learn:  Yes  Cooperative: Yes  Receptive (willing/able to accept information):  Yes  Patient demonstrates understanding of the following:  Date and time of surgery:  10.27.17 at 1315  Location of surgery: [facility]- 5th Floor  History and Physical and any other testing necessary prior to surgery: Yes  Required time line for completion of History and Physical and any pre-op testing: Yes    NPO Guidelines: NPO per Anesthesia Guidelines    Patient demonstrates understanding of the following:  Pre-op bowel prep: not needed  Pre-op showering/scrub information with Hibiclens Soap: Yes  Medications to take the day of surgery:  Per PCP  Blood thinner medications discussed and when to stop (if applicable):  Yes  Diabetes medication management (if applicable):  N/A  Discussed pain control after surgery: pain scale, pain medications and pain management techniques  Infection Prevention: Patient demonstrates understanding of the following:  Patient instructed on hand hygiene:  Yes  Surgical procedure site care taught: N/A  Signs and symptoms of infection taught:  Yes  Wound care will be taught at the time of discharge.  Central venous catheter care will be taught at the time of discharge (if applicable).    Post-op follow-up:  Discussed how to contact the hospital, nurse, and clinic scheduling staff if necessary.    Instructional materials used/given/mailed:  [facility] Booklet, post op teaching sheet, Map, Soap, and arrival/location information.    Surgical instructions given to patient in clinic: Yes.    Instructional Materials given:  Before your surgery packet , Medications to avoid before  surgery , Showering or Bathing instructions before surgery  and What to expect after surgery    Post-op appointment/testing scheduled per MD orders: Yes, 11.20.17 at 0900 with Dr Payton    Total time with patient: 10 minutes    Lily Schuler RN-BC, BSN  Urology Care Coordinator

## 2017-10-13 ENCOUNTER — CARE COORDINATION (OUTPATIENT)
Dept: UROLOGY | Facility: CLINIC | Age: 37
End: 2017-10-13

## 2017-10-13 NOTE — PROGRESS NOTES
Upcoming surgical procedure with Dr Chito Cadet on 10.27.17 at 1415, check in at 1245.   Surgery at ([facility] or [facility]): [facility]  Patient is having a transurethral incision of bladder neck  Pre-op physical completed: YES. Date-10.17.17.  PAC: No  Bowel Prep: No, not needed  Urine culture completed: YES. Date-10.17.17. NO GROWTH    Post-operative appointment needed: YES. Date-11.20.17 at 0900 with Dr Payton.  All questions answered.    Patient verbalized understanding. No further questions.      Lily Schuler, RN-BC, BSN  Care Coordinator - Reconstructive Urology  [phone]

## 2017-10-26 ENCOUNTER — ANESTHESIA EVENT (OUTPATIENT)
Dept: SURGERY | Facility: AMBULATORY SURGERY CENTER | Age: 37
End: 2017-10-26

## 2017-10-27 ENCOUNTER — ANESTHESIA (OUTPATIENT)
Dept: SURGERY | Facility: AMBULATORY SURGERY CENTER | Age: 37
End: 2017-10-27

## 2017-10-27 ENCOUNTER — HOSPITAL ENCOUNTER (OUTPATIENT)
Facility: AMBULATORY SURGERY CENTER | Age: 37
End: 2017-10-27
Attending: UROLOGY

## 2017-10-27 VITALS
DIASTOLIC BLOOD PRESSURE: 71 MMHG | BODY MASS INDEX: 34.02 KG/M2 | OXYGEN SATURATION: 94 % | WEIGHT: 243 LBS | RESPIRATION RATE: 14 BRPM | HEIGHT: 71 IN | TEMPERATURE: 97.3 F | SYSTOLIC BLOOD PRESSURE: 115 MMHG

## 2017-10-27 RX ORDER — FENTANYL CITRATE 50 UG/ML
25-50 INJECTION, SOLUTION INTRAMUSCULAR; INTRAVENOUS
Status: DISCONTINUED | OUTPATIENT
Start: 2017-10-27 | End: 2017-10-27 | Stop reason: HOSPADM

## 2017-10-27 RX ORDER — ONDANSETRON 2 MG/ML
4 INJECTION INTRAMUSCULAR; INTRAVENOUS EVERY 30 MIN PRN
Status: DISCONTINUED | OUTPATIENT
Start: 2017-10-27 | End: 2017-10-28 | Stop reason: HOSPADM

## 2017-10-27 RX ORDER — ONDANSETRON 4 MG/1
4 TABLET, ORALLY DISINTEGRATING ORAL EVERY 30 MIN PRN
Status: DISCONTINUED | OUTPATIENT
Start: 2017-10-27 | End: 2017-10-28 | Stop reason: HOSPADM

## 2017-10-27 RX ORDER — PROPOFOL 10 MG/ML
INJECTION, EMULSION INTRAVENOUS PRN
Status: DISCONTINUED | OUTPATIENT
Start: 2017-10-27 | End: 2017-10-27

## 2017-10-27 RX ORDER — SODIUM CHLORIDE, SODIUM LACTATE, POTASSIUM CHLORIDE, CALCIUM CHLORIDE 600; 310; 30; 20 MG/100ML; MG/100ML; MG/100ML; MG/100ML
INJECTION, SOLUTION INTRAVENOUS CONTINUOUS
Status: DISCONTINUED | OUTPATIENT
Start: 2017-10-27 | End: 2017-10-28 | Stop reason: HOSPADM

## 2017-10-27 RX ORDER — KETOROLAC TROMETHAMINE 30 MG/ML
30 INJECTION, SOLUTION INTRAMUSCULAR; INTRAVENOUS ONCE
Status: COMPLETED | OUTPATIENT
Start: 2017-10-27 | End: 2017-10-27

## 2017-10-27 RX ORDER — MEPERIDINE HYDROCHLORIDE 25 MG/ML
12.5 INJECTION INTRAMUSCULAR; INTRAVENOUS; SUBCUTANEOUS
Status: DISCONTINUED | OUTPATIENT
Start: 2017-10-27 | End: 2017-10-28 | Stop reason: HOSPADM

## 2017-10-27 RX ORDER — ACETAMINOPHEN 325 MG/1
975 TABLET ORAL ONCE
Status: COMPLETED | OUTPATIENT
Start: 2017-10-27 | End: 2017-10-27

## 2017-10-27 RX ORDER — NALOXONE HYDROCHLORIDE 0.4 MG/ML
.1-.4 INJECTION, SOLUTION INTRAMUSCULAR; INTRAVENOUS; SUBCUTANEOUS
Status: DISCONTINUED | OUTPATIENT
Start: 2017-10-27 | End: 2017-10-28 | Stop reason: HOSPADM

## 2017-10-27 RX ORDER — CHLORHEXIDINE GLUCONATE 40 MG/ML
SOLUTION TOPICAL ONCE
Status: DISCONTINUED | OUTPATIENT
Start: 2017-10-27 | End: 2017-10-27 | Stop reason: HOSPADM

## 2017-10-27 RX ORDER — FENTANYL CITRATE 50 UG/ML
INJECTION, SOLUTION INTRAMUSCULAR; INTRAVENOUS PRN
Status: DISCONTINUED | OUTPATIENT
Start: 2017-10-27 | End: 2017-10-27

## 2017-10-27 RX ORDER — GABAPENTIN 300 MG/1
300 CAPSULE ORAL ONCE
Status: COMPLETED | OUTPATIENT
Start: 2017-10-27 | End: 2017-10-27

## 2017-10-27 RX ORDER — SODIUM CHLORIDE, SODIUM LACTATE, POTASSIUM CHLORIDE, CALCIUM CHLORIDE 600; 310; 30; 20 MG/100ML; MG/100ML; MG/100ML; MG/100ML
INJECTION, SOLUTION INTRAVENOUS CONTINUOUS
Status: DISCONTINUED | OUTPATIENT
Start: 2017-10-27 | End: 2017-10-27 | Stop reason: HOSPADM

## 2017-10-27 RX ORDER — FENTANYL CITRATE 50 UG/ML
25-50 INJECTION, SOLUTION INTRAMUSCULAR; INTRAVENOUS
Status: DISCONTINUED | OUTPATIENT
Start: 2017-10-27 | End: 2017-10-28 | Stop reason: HOSPADM

## 2017-10-27 RX ORDER — LIDOCAINE HYDROCHLORIDE 20 MG/ML
INJECTION, SOLUTION INFILTRATION; PERINEURAL PRN
Status: DISCONTINUED | OUTPATIENT
Start: 2017-10-27 | End: 2017-10-27

## 2017-10-27 RX ORDER — CEFAZOLIN SODIUM 1 G/3ML
1 INJECTION, POWDER, FOR SOLUTION INTRAMUSCULAR; INTRAVENOUS SEE ADMIN INSTRUCTIONS
Status: DISCONTINUED | OUTPATIENT
Start: 2017-10-27 | End: 2017-10-27 | Stop reason: HOSPADM

## 2017-10-27 RX ORDER — ONDANSETRON 2 MG/ML
INJECTION INTRAMUSCULAR; INTRAVENOUS PRN
Status: DISCONTINUED | OUTPATIENT
Start: 2017-10-27 | End: 2017-10-27

## 2017-10-27 RX ORDER — PROPOFOL 10 MG/ML
INJECTION, EMULSION INTRAVENOUS CONTINUOUS PRN
Status: DISCONTINUED | OUTPATIENT
Start: 2017-10-27 | End: 2017-10-27

## 2017-10-27 RX ORDER — DEXAMETHASONE SODIUM PHOSPHATE 4 MG/ML
INJECTION, SOLUTION INTRA-ARTICULAR; INTRALESIONAL; INTRAMUSCULAR; INTRAVENOUS; SOFT TISSUE PRN
Status: DISCONTINUED | OUTPATIENT
Start: 2017-10-27 | End: 2017-10-27

## 2017-10-27 RX ORDER — LIDOCAINE 40 MG/G
CREAM TOPICAL
Status: DISCONTINUED | OUTPATIENT
Start: 2017-10-27 | End: 2017-10-27 | Stop reason: HOSPADM

## 2017-10-27 RX ADMIN — GABAPENTIN 300 MG: 300 CAPSULE ORAL at 13:13

## 2017-10-27 RX ADMIN — KETOROLAC TROMETHAMINE 30 MG: 30 INJECTION, SOLUTION INTRAMUSCULAR; INTRAVENOUS at 16:13

## 2017-10-27 RX ADMIN — DEXAMETHASONE SODIUM PHOSPHATE 4 MG: 4 INJECTION, SOLUTION INTRA-ARTICULAR; INTRALESIONAL; INTRAMUSCULAR; INTRAVENOUS; SOFT TISSUE at 14:24

## 2017-10-27 RX ADMIN — FENTANYL CITRATE 50 MCG: 50 INJECTION, SOLUTION INTRAMUSCULAR; INTRAVENOUS at 15:06

## 2017-10-27 RX ADMIN — ACETAMINOPHEN 975 MG: 325 TABLET ORAL at 13:13

## 2017-10-27 RX ADMIN — FENTANYL CITRATE 50 MCG: 50 INJECTION, SOLUTION INTRAMUSCULAR; INTRAVENOUS at 15:15

## 2017-10-27 RX ADMIN — PROPOFOL 100 MCG/KG/MIN: 10 INJECTION, EMULSION INTRAVENOUS at 14:16

## 2017-10-27 RX ADMIN — PROPOFOL 200 MG: 10 INJECTION, EMULSION INTRAVENOUS at 14:16

## 2017-10-27 RX ADMIN — SODIUM CHLORIDE, SODIUM LACTATE, POTASSIUM CHLORIDE, CALCIUM CHLORIDE: 600; 310; 30; 20 INJECTION, SOLUTION INTRAVENOUS at 13:13

## 2017-10-27 RX ADMIN — FENTANYL CITRATE 25 MCG: 50 INJECTION, SOLUTION INTRAMUSCULAR; INTRAVENOUS at 14:28

## 2017-10-27 RX ADMIN — ONDANSETRON 4 MG: 2 INJECTION INTRAMUSCULAR; INTRAVENOUS at 14:39

## 2017-10-27 RX ADMIN — FENTANYL CITRATE 50 MCG: 50 INJECTION, SOLUTION INTRAMUSCULAR; INTRAVENOUS at 14:12

## 2017-10-27 RX ADMIN — LIDOCAINE HYDROCHLORIDE 100 MG: 20 INJECTION, SOLUTION INFILTRATION; PERINEURAL at 14:16

## 2017-10-27 ASSESSMENT — LIFESTYLE VARIABLES: TOBACCO_USE: 0

## 2017-10-27 ASSESSMENT — COPD QUESTIONNAIRES: COPD: 0

## 2017-10-27 NOTE — ANESTHESIA PREPROCEDURE EVALUATION
Anesthesia Evaluation     . Pt has had prior anesthetic. Type: General    History of anesthetic complications (anxiety on wake up)   - PONV        ROS/MED HX    ENT/Pulmonary:      (-) tobacco use, asthma and COPD   Neurologic:     (+)other neuro (GBS vs cauda equina/spinal cord infarct. LE neurapathy and weakness)    (-) CVA, TIA and Neuropathy   Cardiovascular:     (+) hypertension----. : . . . :. . Previous cardiac testing date:results:Stress Testdate: results:10/16/2015 12:00 AM CDT  [facility]  [place], [place]    PHARMACOLOGIC MYOCARDIAL PERFUSION SCAN    Supervising provider: Robert Zee MD,    INDICATION: Syncope and left bundle branch block.     MEDICATIONS: Prozac, melatonin, Aleve.     RESTING EKG: Heart rate at rest is 67 with left bundle branch block.     NUCLEAR PROTOCOL:  LEXISCAN:  0.4 mg.   TECHNETIUM SESTAMIBI:  10.6 mCi for the rest injection.   TECHNETIUM SESTAMIBI:  28.2 mCi for the stress injection.    RESULTS:  EKG during the Lexiscan injection shows the heart rate increased up to 110.  No diagnostic changes on EKG. Left bundle branch block persists.     PERFUSION IMAGES:  Myocardial perfusion images were obtained.  The images were compared at rest versus stress.  No reversible defect for ischemia.  Mid septal fixed defect likely related to the bundle branch block.      GATED WALL MOTION ANALYSIS:  Left ventricular ejection fraction 44%. Septal wall motion abnormality noted.       FINAL IMPRESSION:  1. Lexiscan stress myocardial perfusion imaging. Adequate heart rate response.  No diagnostic changes on EKG with left bundle branch block noted at rest and on the Lexiscan injection.     2. Myocardial perfusion imaging with no reversible defect for ischemia. An area of perfusion defect in the mid septal segment likely secondary to bundle branch block artifact.    3. Gated wall motion analysis with left ventricular ejection fraction of 44%  with septal hypokinesis.  TID score is 0.93.  ECG reviewed date: results:NSR, LBBB date: results:         (-) CAD, irregular heartbeat/palpitations and stent   METS/Exercise Tolerance:     Hematologic:        (-) anemia   Musculoskeletal:         GI/Hepatic:        (-) GERD and liver disease   Renal/Genitourinary:      (-) renal disease   Endo:     (+) Obesity, .   (-) Type I DM, Type II DM and thyroid disease   Psychiatric:         Infectious Disease:  - neg infectious disease ROS       Malignancy:         Other:                     Physical Exam  Normal systems: cardiovascular, pulmonary and dental    Airway   Mallampati: I  TM distance: >3 FB  Neck ROM: full    Dental     Cardiovascular   Rhythm and rate: regular and normal      Pulmonary    breath sounds clear to auscultation                    Anesthesia Plan      History & Physical Review  History and physical reviewed and following examination; no interval change.    ASA Status:  2 .    NPO Status:  > 8 hours    Plan for General and LMA with Intravenous and Propofol induction. Maintenance will be Balanced.    PONV prophylaxis:  Ondansetron (or other 5HT-3) and Dexamethasone or Solumedrol       Postoperative Care  Postoperative pain management:  Oral pain medications.      Consents  Anesthetic plan, risks, benefits and alternatives discussed with:  Patient..                          .

## 2017-10-27 NOTE — DISCHARGE INSTRUCTIONS
[facility]  Home Care Following Anesthesia  For 24 hours after surgery:  1. Get plenty of rest.  A responsible adult must stay with you for at least 24 hours after you leave the surgery center.  2. Do not drive or use heavy equipment.  If you have weakness or tingling, don't drive or use heavy equipment until this feeling goes away.   3. Do not drink alcohol.   4. Avoid strenuous or risky activities.  Ask for help when climbing stairs.  5. You may feel lightheaded.  IF so, sit for a few minutes before standing.  Have someone help you get up.   6. If you have nausea (feel sick to your stomach): Drink only clear liquids such as apple juice, ginger ale, broth or 7-Up.  Rest may also help.  Be sure to drink enough fluids.  Move to a regular diet as you feel able.   7. You may have a slight fever.  Call the doctor if your fever is over 100 F (37.7 C) (taken under the tongue) or lasts longer than 24 hours.  8. You may have a dry mouth, a sore throat, muscle aches or trouble sleeping. These should go away after 24 hours.  9. Do not make important or legal decisions.     Tips for taking pain medications  To get the best pain relief possible, remember these points:    Take pain medications as directed, before pain becomes severe.    Pain medication can upset your stomach: taking it with food may help.    Constipation is a common side effect of pain medication. Drink plenty of  fluids.    Eat foods high in fiber. Take a stool softener if recommended by your doctor or pharmacist.    Do not drink alcohol, drive or operate machinery while taking pain medications.    Ask about other ways to control pain, such as with heat, ice or relaxation.    Tylenol/Acetaminophen Consumption  To help encourage the safe use of acetaminophen, the makers of TYLENOL  have lowered the maximum daily dose for single-ingredient Extra Strength TYLENOL  (acetaminophen) products sold in the [place] from 8 pills per day  (4,000 mg) to 6 pills per day (3,000 mg). The dosing interval has also changed from 2 pills every 4-6 hours to 2 pills every 6 hours.    If you feel your pain relief is insufficient, you may take Tylenol/Acetaminophen in addition to your narcotic pain medication.     Be careful not to exceed 3,000 mg of Tylenol/Acetaminophen in a 24 hour period from all sources.    If you are taking extra strength Tylenol/acetaminophen (500 mg), the maximum dose is 6 tablets in 24 hours.    If you are taking regular strength acetaminophen (325 mg), the maximum dose is 9 tablets in 24 hours.    Call a doctor for any of the followin. Signs of infection (fever, growing tenderness at the surgery site, a large amount of drainage or bleeding, severe pain, foul-smelling drainage, redness, swelling).  2. It has been over 8 to 10 hours since surgery and you are still not able to urinate (pass water).  3. Headache for over 24 hours.  Your doctor is:  Dr. Chito Anderson, Prostate and Urology: [phone]  Or dial [phone] and ask for the resident on call for:  Prostate Urology  For emergency care, call the:  [place]:  [phone] (TTY for hearing impaired: [phone])    Care of Indwelling Lares Catheter  Cleanliness is VERY important!  1. Wash well around catheter with soap and water and rinse well.  Do this every morning and before bedtime.  2. Empty leg bag or bed bag into toilet whenever it becomes half full.  3. When disconnecting and reconnecting, wipe both the catheter end and tubing tip with alcohol. (You may use commercially prepared alcohol wipes or regular cotton balls soaked in alcohol.)  4. Rinse leg bag and bed bag inside and out after each use. You can soak leg bag and/or bed bag in two to three ounces of white vinegar when not in use. Rinse thoroughly before reconnecting to catheter.  5. You may clamp the catheter for short periods of time (two to three hours) if you are not uncomfortable. If  planning intercourse: clamp catheter, disconnect from bag and   a) Tape catheter along shaft of penis, if male; or  b) Tape catheter to abdomen, if female  6. Drink lots of fluids (at least eight to ten cups/glasses per day) and take two to four grams of Vitamin C (optional) per day.      7. Watch for sign of catheter-associated urinary tract infection which include:      Cloudy urine, sediment in urine (may look like sand particles or white                           flakes), foul smelling urine    A burning feeling, pressure or pain in your lower abdomen    A burning feeling in the urethra or genitalia    Aching in the back (by the kidney)    At the time of discharge from the hospital, you have a 14 Lares catheter with a  10 cc balloon. It was inserted on October 27, 2017 and should be removed in clinic with a trial of void.

## 2017-10-27 NOTE — IP AVS SNAPSHOT
MRN:[id number]                      After Visit Summary   10/27/2017    Alcides Velazquez    MRN: [id number]           Thank you!     Thank you for choosing [facility] for your care. Our goal is always to provide you with excellent care. Hearing back from our patients is one way we can continue to improve our services. Please take a few minutes to complete the written survey that you may receive in the mail after you visit with us. Thank you!        Patient Information     Date Of Birth          1980        About your hospital stay     You were admitted on:  October 27, 2017 [facility]    You were discharged on:  October 27, 2017       Who to Call     For medical emergencies, please call 911.  For non-urgent questions about your medical care, please call your primary care provider or clinic, [phone]  For questions related to your surgery, please call your surgery clinic        Attending Provider     Provider Specialty    Chito Cadet MD Urology       Primary Care Provider Office Phone # Fax #    Ismael Yan [phone]      After Care Instructions     Discharge Instructions       Discharge patient with leg bag.   Patient to arrange for follow up appointment in 3 days in clinic for trial of void    Activity  - No strenuous exercise for 3-5 days.  - No lifting, pushing, pulling more than 15 pounds for 3-5 days.   - Do not strain with bowel movements.  - Do not drive until you can press the brake pedal quickly and fully without pain.   - Do not operate a motor vehicle while taking narcotic pain medications.   - Some blood in the urine is expected. Increase your fluid intake to help flush the blood out of your bladder    Medications  - No narcotic pain medications are required and over the counter tylenol should suffice.  Wean yourself off all pain medications as you are able.  - Some pain medications contain both  "tylenol (acetaminophen) and a narcotic (Norco, vicodin, percocet), do not take more than 4,000mg of Tylenol (acetaminophen) from all sources in any 24 hour period.  - Take over the counter fiber (metamucil or benefiber) and stool softeners (miralax, docusate or senna) to prevent postoperative constipation, but stop if you develop diarrhea.  - No driving or operating machinery while taking narcotic pain medications     Follow-Up:  - Follow up with Dr. Payton (Dr. Cadet's fellow) in 2 weeks.   - Call or return sooner than your regularly scheduled visit if you develop any of the following: fever (greater than 101.5), uncontrolled pain, uncontrolled nausea or vomiting, as well as worsening blood in the urine    Phone numbers:   - Monday through Friday 8am to 4:30pm: Call [phone] with questions or to schedule or confirm appointment.    - Nights or weekends: call the after hours emergency pager - [phone] and tell the  \"I would like to page the Urology Resident on call.\"  - For emergencies, call 911                  Your next 10 appointments already scheduled     Nov 20, 2017  9:00 AM CST   (Arrive by 8:45 AM)   Post-Op with Bib Payton MD   [facility] ([facility])    [address] [place] [id number] [phone]              Further instructions from your care team       [facility]  Home Care Following Anesthesia  For 24 hours after surgery:  1. Get plenty of rest.  A responsible adult must stay with you for at least 24 hours after you leave the surgery center.  2. Do not drive or use heavy equipment.  If you have weakness or tingling, don't drive or use heavy equipment until this feeling goes away.   3. Do not drink alcohol.   4. Avoid strenuous or risky activities.  Ask for help when climbing stairs.  5. You may feel lightheaded.  IF so, sit for a few minutes " before standing.  Have someone help you get up.   6. If you have nausea (feel sick to your stomach): Drink only clear liquids such as apple juice, ginger ale, broth or 7-Up.  Rest may also help.  Be sure to drink enough fluids.  Move to a regular diet as you feel able.   7. You may have a slight fever.  Call the doctor if your fever is over 100 F (37.7 C) (taken under the tongue) or lasts longer than 24 hours.  8. You may have a dry mouth, a sore throat, muscle aches or trouble sleeping. These should go away after 24 hours.  9. Do not make important or legal decisions.     Tips for taking pain medications  To get the best pain relief possible, remember these points:    Take pain medications as directed, before pain becomes severe.    Pain medication can upset your stomach: taking it with food may help.    Constipation is a common side effect of pain medication. Drink plenty of  fluids.    Eat foods high in fiber. Take a stool softener if recommended by your doctor or pharmacist.    Do not drink alcohol, drive or operate machinery while taking pain medications.    Ask about other ways to control pain, such as with heat, ice or relaxation.    Tylenol/Acetaminophen Consumption  To help encourage the safe use of acetaminophen, the makers of TYLENOL  have lowered the maximum daily dose for single-ingredient Extra Strength TYLENOL  (acetaminophen) products sold in the [place] from 8 pills per day (4,000 mg) to 6 pills per day (3,000 mg). The dosing interval has also changed from 2 pills every 4-6 hours to 2 pills every 6 hours.    If you feel your pain relief is insufficient, you may take Tylenol/Acetaminophen in addition to your narcotic pain medication.     Be careful not to exceed 3,000 mg of Tylenol/Acetaminophen in a 24 hour period from all sources.    If you are taking extra strength Tylenol/acetaminophen (500 mg), the maximum dose is 6 tablets in 24 hours.    If you are taking regular strength acetaminophen (325 mg),  the maximum dose is 9 tablets in 24 hours.    Call a doctor for any of the followin. Signs of infection (fever, growing tenderness at the surgery site, a large amount of drainage or bleeding, severe pain, foul-smelling drainage, redness, swelling).  2. It has been over 8 to 10 hours since surgery and you are still not able to urinate (pass water).  3. Headache for over 24 hours.  Your doctor is:  Dr. Chito Anderson, Prostate and Urology: [phone]  Or dial [phone] and ask for the resident on call for:  Prostate Urology  For emergency care, call the:  [facility]:  [phone] (TTY for hearing impaired: [phone])    Care of Indwelling Lares Catheter  Cleanliness is VERY important!  1. Wash well around catheter with soap and water and rinse well.  Do this every morning and before bedtime.  2. Empty leg bag or bed bag into toilet whenever it becomes half full.  3. When disconnecting and reconnecting, wipe both the catheter end and tubing tip with alcohol. (You may use commercially prepared alcohol wipes or regular cotton balls soaked in alcohol.)  4. Rinse leg bag and bed bag inside and out after each use. You can soak leg bag and/or bed bag in two to three ounces of white vinegar when not in use. Rinse thoroughly before reconnecting to catheter.  5. You may clamp the catheter for short periods of time (two to three hours) if you are not uncomfortable. If planning intercourse: clamp catheter, disconnect from bag and   a) Tape catheter along shaft of penis, if male; or  b) Tape catheter to abdomen, if female  6. Drink lots of fluids (at least eight to ten cups/glasses per day) and take two to four grams of Vitamin C (optional) per day.      7. Watch for sign of catheter-associated urinary tract infection which include:      Cloudy urine, sediment in urine (may look like sand particles or white                           flakes), foul smelling urine    A burning feeling, pressure or pain  "in your lower abdomen    A burning feeling in the urethra or genitalia    Aching in the back (by the kidney)    At the time of discharge from the hospital, you have a 14 Lares catheter with a  10 cc balloon. It was inserted on 2017 and should be removed in clinic with a trial of void.        Pending Results     No orders found from 10/25/2017 to 10/28/2017.            Admission Information     Date & Time Provider Department Dept. Phone    10/27/2017 Chito Cadet MD [facility] [phone]      Your Vitals Were     Blood Pressure Temperature Respirations Height Weight Pulse Oximetry    113/74 97.2  F (36.2  C) (Temporal) 12 1.803 m (5' 11\") 110.2 kg (243 lb) 94%    BMI (Body Mass Index)                   33.89 kg/m2           [employer] Information     [employer] is an electronic gateway that provides easy, online access to your medical records. With [employer], you can request a clinic appointment, read your test results, renew a prescription or communicate with your care team.     To sign up for [employer] visit the website at [email or web address]   You will be asked to enter the access code listed below, as well as some personal information. Please follow the directions to create your username and password.     Your access code is: [email or web address]  Expires: 2017  6:30 AM     Your access code will  in 90 days. If you need help or a new code, please contact your [facility] or call [phone] for assistance.        Care EveryWhere ID     This is your Care EveryWhere ID. This could be used by other organizations to access your [facility] medical records  [id number]        Equal Access to Services     KRYSTAL GOULD : Mateo Keene, daniel brock, gr olguinalselwyn coates. So [facility] [phone].    ATENCIÓN: Si habla español, tiene a cortes disposición servicios gratuitos de " josé antoniomaryrufus Alanis al [phone].    We comply with applicable federal civil rights laws and [place] laws. We do not discriminate on the basis of race, color, national origin, age, disability, sex, sexual orientation, or gender identity.               Review of your medicines      UNREVIEWED medicines. Ask your doctor about these medicines        Dose / Directions    ALPRAZolam 0.5 MG tablet   Commonly known as:  XANAX        Dose:  0.5 mg   Take 0.5 mg by mouth daily as needed   Refills:  0       FLUoxetine 40 MG capsule   Commonly known as:  PROzac        Dose:  40 mg   Take 40 mg by mouth daily   Refills:  0       levofloxacin 500 MG tablet   Commonly known as:  LEVAQUIN   Used for:  Urethral stricture, unspecified        Dose:  500 mg   Take 1 tablet (500 mg) by mouth daily   Quantity:  4 tablet   Refills:  0       melatonin 5 MG Caps        Dose:  5 mg   Take 5 mg by mouth daily   Refills:  0       naproxen sodium 220 MG tablet   Commonly known as:  ANAPROX        Dose:  220 mg   Take 220 mg by mouth daily   Refills:  0       NIFEdipine ER osmotic 30 MG 24 hr tablet   Commonly known as:  PROCARDIA XL        Dose:  30 mg   Take 30 mg by mouth daily   Refills:  0       polyethylene glycol powder   Commonly known as:  MIRALAX/GLYCOLAX        MIX 17 GRAMS (1 CAPFUL) IN LIQUID AND DRINK TWICE DAILY   Refills:  0       tamsulosin 0.4 MG capsule   Commonly known as:  FLOMAX        Dose:  0.4 mg   Take 0.4 mg by mouth daily   Refills:  0       traMADol 50 MG tablet   Commonly known as:  ULTRAM        Dose:   mg   Take  mg by mouth nightly as needed   Refills:  0                Protect others around you: Learn how to safely use, store and throw away your medicines at www.disposemymeds.org.             Medication List: This is a list of all your medications and when to take them. Check marks below indicate your daily home schedule. Keep this list as a reference.      Medications            Morning Afternoon Evening Bedtime As Needed    ALPRAZolam 0.5 MG tablet   Commonly known as:  XANAX   Take 0.5 mg by mouth daily as needed                                FLUoxetine 40 MG capsule   Commonly known as:  PROzac   Take 40 mg by mouth daily                                levofloxacin 500 MG tablet   Commonly known as:  LEVAQUIN   Take 1 tablet (500 mg) by mouth daily                                melatonin 5 MG Caps   Take 5 mg by mouth daily                                naproxen sodium 220 MG tablet   Commonly known as:  ANAPROX   Take 220 mg by mouth daily                                NIFEdipine ER osmotic 30 MG 24 hr tablet   Commonly known as:  PROCARDIA XL   Take 30 mg by mouth daily                                polyethylene glycol powder   Commonly known as:  MIRALAX/GLYCOLAX   MIX 17 GRAMS (1 CAPFUL) IN LIQUID AND DRINK TWICE DAILY                                tamsulosin 0.4 MG capsule   Commonly known as:  FLOMAX   Take 0.4 mg by mouth daily                                traMADol 50 MG tablet   Commonly known as:  ULTRAM   Take  mg by mouth nightly as needed

## 2017-10-27 NOTE — ANESTHESIA POSTPROCEDURE EVALUATION
Patient: Alcides Velazquez    Procedure(s):  Transurethral Incision of Bladder Neck - Wound Class: II-Clean Contaminated    Diagnosis:Bladder Neck Obstruction  Diagnosis Additional Information: No value filed.    Anesthesia Type:  General, LMA    Note:  Anesthesia Post Evaluation    Patient location during evaluation: PACU  Patient participation: Able to fully participate in evaluation  Level of consciousness: awake and alert  Pain management: adequate  Airway patency: patent  Cardiovascular status: hemodynamically stable  Respiratory status: acceptable  Hydration status: stable  PONV: none     Anesthetic complications: None          Last vitals:  Vitals:    10/27/17 1500 10/27/17 1515 10/27/17 1525   BP: 133/56 113/74 115/72   Resp: 14 12 12   Temp: 36.2  C (97.2  F) 36.2  C (97.2  F)    SpO2: 92% 94% 95%         Electronically Signed By: London Nathan MD  October 27, 2017  3:34 PM

## 2017-10-27 NOTE — ANESTHESIA CARE TRANSFER NOTE
Patient: Alcides Velazquez    Procedure(s):  Transurethral Incision of Bladder Neck - Wound Class: II-Clean Contaminated    Diagnosis: Bladder Neck Obstruction  Diagnosis Additional Information: No value filed.    Anesthesia Type:   General, LMA     Note:  Airway :Face Mask  Patient transferred to:PACU  Comments: 133/56 95% 97.2F - 86 - 12Handoff Report: Identifed the Patient, Identified the Reponsible Provider, Reviewed the pertinent medical history, Discussed the surgical course, Reviewed Intra-OP anesthesia mangement and issues during anesthesia, Set expectations for post-procedure period and Allowed opportunity for questions and acknowledgement of understanding      Vitals: (Last set prior to Anesthesia Care Transfer)    CRNA VITALS  10/27/2017 1424 - 10/27/2017 1502      10/27/2017             Pulse: 78    SpO2: 90 %    Resp Rate (set): 10                Electronically Signed By: EDDI Pandya CRNA  October 27, 2017  3:02 PM

## 2017-10-27 NOTE — IP AVS SNAPSHOT
[facility]    [address] [place] [id number]    Phone:  [phone]    Fax:  [phone]                                       After Visit Summary   10/27/2017    Alcides Velazquez    MRN: [id number]           After Visit Summary Signature Page     I have received my discharge instructions, and my questions have been answered. I have discussed any challenges I see with this plan with the nurse or doctor.    ..........................................................................................................................................  Patient/Patient Representative Signature      ..........................................................................................................................................  Patient Representative Print Name and Relationship to Patient    ..................................................               ................................................  Date                                            Time    ..........................................................................................................................................  Reviewed by Signature/Title    ...................................................              ..............................................  Date                                                            Time

## 2017-10-27 NOTE — OP NOTE
Preoperative Diagnosis:Bladder outlet obstruction    Postoperative Diagnosis:Same    Procedure: Cystoscopy and Hot Knife Transurethral incision of Bladder neck and  Prostate    Surgeon: Chito Cadet MD    Assistant: Bib Payton MD Fellow    Anesthesia: General    EBL: 10 cc    Drains: 18F Cisse catheter     Indications:   Alcides Velazquez is a 37 year old y/o man with a He had Guillan-Steele and slow stream and hesitancy. UDS showed BNO. We went in to do TUIBN previously with us but was  found a urethral stricture so we opened that and then reassessed today. He says things are only slightly better. He is still very bothered by slow stream, hesitancy, sense of incomplete voiding and overflow incontinence. He would like to proceed with TUIBN. He understands urethral stricture can recur in the future.  He understands there will be retrograde ejaculation.    Description of Procedure:   After informed consent and pre-operative antibiotics were given the patient was taken to the operating room where general anesthesia was delivered. He was prepped and draped in the lithotomy position with all pressure points well-padded.       Cystoscopy was performed to verify that his previously incised urethral stricture had not recurred - and indeed it had not.  Then we inserted the 22F resectoscope and 2 incisions were made at the 5 and 7 o'clock positions between the ureteral orifice and the veru montanum. This opened up the bladder neck very nicely.  Good hemostasis was obtained. The sphincter and ureteral orifices were kept out of harm;s way and were uninjured. Minimal hematuria was observed despite turning the water flow off.     18F cisse catheter was placed and irrigated.    The patient was awakened from anesthesia and taken to the recovery room in stable condition.    Plan: 3 day cisse, TOV in 3 days in clinic    As the attending surgeon I, Chito Cadet, was present and scrubbed throughout the procedure.

## 2017-11-07 ENCOUNTER — PRE VISIT (OUTPATIENT)
Dept: UROLOGY | Facility: CLINIC | Age: 37
End: 2017-11-07

## 2017-11-08 NOTE — TELEPHONE ENCOUNTER
Pt with a history of bladder neck obstruction coming in for a post op. All records available. Generic message left instructing pt to come with a full bladder for a flow/pvr.

## 2017-11-13 ENCOUNTER — OFFICE VISIT (OUTPATIENT)
Dept: UROLOGY | Facility: CLINIC | Age: 37
End: 2017-11-13

## 2017-11-13 VITALS
HEART RATE: 67 BPM | SYSTOLIC BLOOD PRESSURE: 135 MMHG | HEIGHT: 71 IN | DIASTOLIC BLOOD PRESSURE: 84 MMHG | BODY MASS INDEX: 34.44 KG/M2 | WEIGHT: 246 LBS

## 2017-11-13 DIAGNOSIS — N32.81 OVERACTIVE BLADDER: Primary | ICD-10-CM

## 2017-11-13 DIAGNOSIS — N32.0 BLADDER NECK OBSTRUCTION: Primary | ICD-10-CM

## 2017-11-13 RX ORDER — TRAMADOL HYDROCHLORIDE 50 MG/1
TABLET ORAL
COMMUNITY
Start: 2017-11-06

## 2017-11-13 RX ORDER — ALPRAZOLAM 0.5 MG
0.5 TABLET ORAL
COMMUNITY
Start: 2017-11-06 | End: 2018-10-31

## 2017-11-13 ASSESSMENT — PAIN SCALES - GENERAL: PAINLEVEL: NO PAIN (0)

## 2017-11-13 NOTE — PROGRESS NOTES
Alcides Velazquez is a 37 year old male who is 8  weeks s/p DVIU of bulbar urethral stricture and 2 weeks s/p TUIBN/TUIP.    Pain is none  Appetite is normal  Bowel movements are normal  Urination is still frequent and slow -- he really doesn't feel any better than before surgery.     Vital signs reviewed    Urinary Flow Rate  Peak Flow: 6.8 mL/s  Average Flow: 2.7 mL/s  Voided (mL): 157 mL  Residual Volume by Ultrasound: 160 mL    A/P   Continued symptoms after TUIBN/TUIP. Plan for UDS and cysto in January if symptoms are not better. Of better then can cancel.     As the attending surgeon, I, Chito Cadet, spent 15 minutes with the patient with >50% in consultation and coordination of care.

## 2017-11-13 NOTE — PATIENT INSTRUCTIONS
Urodynamics in January with Lilliana Avalos PA-C.     Follow up with Dr. Cadet after your urodynamics.     It was a pleasure meeting with you today.  Thank you for allowing me and my team the privilege of caring for you today.  YOU are the reason we are here, and I truly hope we provided you with the excellent service you deserve.  Please let us know if there is anything else we can do for you so that we can be sure you are leaving completely satisfied with your care experience.

## 2017-11-13 NOTE — MR AVS SNAPSHOT
After Visit Summary   11/13/2017    Alcides Velazquez    MRN: [id number]           Patient Information     Date Of Birth          1980        Visit Information        Provider Department      11/13/2017 9:00 AM Chito Cadet MD [facility] Urology and Inst for Prostate and Urologic Cancers        Today's Diagnoses     Bladder neck obstruction    -  1      Care Instructions    Urodynamics in January with Lilliana Avalos PA-C.     Follow up with Dr. Cadet after your urodynamics.     It was a pleasure meeting with you today.  Thank you for allowing me and my team the privilege of caring for you today.  YOU are the reason we are here, and I truly hope we provided you with the excellent service you deserve.  Please let us know if there is anything else we can do for you so that we can be sure you are leaving completely satisfied with your care experience.              Follow-ups after your visit        Your next 10 appointments already scheduled     Jan 18, 2018 11:30 AM CST   (Arrive by 11:15 AM)   Urodynamics with Lilliana Avalos PA-C   [facility] Urology and Inst for Prostate and Urologic Cancers ([facility])    [address] [place] [id number] [phone]            Jan 22, 2018  8:00 AM CST   (Arrive by 7:45 AM)   Urodynamics with Lilliana Avalos PA-C   [facility] Urology and Inst for Prostate and Urologic Cancers ([facility])    [address] [place] [id number] [phone]            Jan 22, 2018 12:00 PM CST   (Arrive by 11:45 AM)   Return Visit with Chito Cadet MD   [facility]rology and [facility] for Prostate and Urologic Cancers ([facility])    [address] [place] [id number] [phone]              Who to contact     Please call your clinic at [phone] to:    Ask questions about your health    Make or cancel  "appointments    Discuss your medicines    Learn about your test results    Speak to your doctor   If you have compliments or concerns about an experience at your clinic, or if you wish to file a complaint, please contact [facility] Patient Relations at [phone] or email us at [email or web address]         Additional Information About Your Visit        [employer] Information     [employer] is an electronic gateway that provides easy, online access to your medical records. With [employer], you can request a clinic appointment, read your test results, renew a prescription or communicate with your care team.     To sign up for [employer] visit the website at [email or web address]   You will be asked to enter the access code listed below, as well as some personal information. Please follow the directions to create your username and password.     Your access code is: [email or web address]  Expires: 2017  5:30 AM     Your access code will  in 90 days. If you need help or a new code, please contact your [facility] Clinic or call [phone] for assistance.        Care EveryWhere ID     This is your Care EveryWhere ID. This could be used by other organizations to access your [facility] medical records  [id number]        Your Vitals Were     Pulse Height BMI (Body Mass Index)             67 1.803 m (5' 11\") 34.31 kg/m2          Blood Pressure from Last 3 Encounters:   17 135/84   10/27/17 115/71   17 (!) 160/99    Weight from Last 3 Encounters:   17 111.6 kg (246 lb)   10/27/17 110.2 kg (243 lb)   17 110.2 kg (243 lb)              We Performed the Following     COMPLEX UROFLOWMETRY     POST-VOID RESIDUAL BLADDER SCAN        Primary Care Provider Office Phone # Fax #    Ismael Yan [phone]       [facility] [address] [address]        Equal Access to Services     KRYSTAL GOULD AH: Hadii abhi hdz " tatyana Keene, daniel lukarynadaha, qaalfonsota kajovita bean, selwyn charlesin hayaan svenanna dorislolly lageryasmin melissa. So [facility] [phone].    ATENCIÓN: Si rayola tate, tiene a cortes disposición servicios gratuitos de asistencia lingüística. Zeke al [phone].    We comply with applicable federal civil rights laws and [place] laws. We do not discriminate on the basis of race, color, national origin, age, disability, sex, sexual orientation, or gender identity.            Thank you!     Thank you for choosing [facility] UROLOGY AND [facility] FOR PROSTATE AND UROLOGIC CANCERS  for your care. Our goal is always to provide you with excellent care. Hearing back from our patients is one way we can continue to improve our services. Please take a few minutes to complete the written survey that you may receive in the mail after your visit with us. Thank you!             Your Updated Medication List - Protect others around you: Learn how to safely use, store and throw away your medicines at www.disposemymeds.org.          This list is accurate as of: 11/13/17 10:34 AM.  Always use your most recent med list.                   Brand Name Dispense Instructions for use Diagnosis    * ALPRAZolam 0.5 MG tablet    XANAX     Take 0.5 mg by mouth daily as needed        * ALPRAZolam 0.5 MG tablet    XANAX     Take 0.5 mg by mouth        FLUoxetine 40 MG capsule    PROzac     Take 40 mg by mouth daily        levofloxacin 500 MG tablet    LEVAQUIN    4 tablet    Take 1 tablet (500 mg) by mouth daily    Urethral stricture, unspecified       melatonin 5 MG Caps      Take 5 mg by mouth daily        naproxen sodium 220 MG tablet    ANAPROX     Take 220 mg by mouth daily        NIFEdipine ER osmotic 30 MG 24 hr tablet    PROCARDIA XL     Take 30 mg by mouth daily        polyethylene glycol powder    MIRALAX/GLYCOLAX     MIX 17 GRAMS (1 CAPFUL) IN LIQUID AND DRINK TWICE DAILY        tamsulosin 0.4 MG capsule    FLOMAX     Take 0.4 mg by mouth daily        *  traMADol 50 MG tablet    ULTRAM     Take  mg by mouth nightly as needed        * traMADol 50 MG tablet    ULTRAM     TAKE ONE TO TWO TABLETS BY MOUTH AT BEDTIME AS NEEDED FOR PAIN *CAUTION: OPIOID. RISK OF OVERDOSE AND ADDICTION.        * Notice:  This list has 4 medication(s) that are the same as other medications prescribed for you. Read the directions carefully, and ask your doctor or other care provider to review them with you.

## 2017-11-13 NOTE — NURSING NOTE
Chief Complaint   Patient presents with     Surgical Followup     Pt with a history of bladder neck obstruction coming in for a post op.      Sabina Upton

## 2017-11-13 NOTE — LETTER
11/13/2017       RE: Alcides Velazquez  [address]  [place] [address]     Dear Colleague,    Thank you for referring your patient, Alcides Velazquez, to the [facility] UROLOGY AND INST FOR PROSTATE AND UROLOGIC CANCERS at [facility]. Please see a copy of my visit note below.    Alcides Velazquez is a 37 year old male who is 8  weeks s/p DVIU of bulbar urethral stricture and 2 weeks s/p TUIBN/TUIP.    Pain is none  Appetite is normal  Bowel movements are normal  Urination is still frequent and slow -- he really doesn't feel any better than before surgery.     Vital signs reviewed    Urinary Flow Rate  Peak Flow: 6.8 mL/s  Average Flow: 2.7 mL/s  Voided (mL): 157 mL  Residual Volume by Ultrasound: 160 mL    A/P   Continued symptoms after TUIBN/TUIP. Plan for UDS and cysto in January if symptoms are not better. Of better then can cancel.     As the attending surgeon, I, Chito Cadet, spent 15 minutes with the patient with >50% in consultation and coordination of care.      Sincerely,  Chito Cadet MD

## 2017-11-14 RX ORDER — MIRABEGRON 25 MG/1
TABLET, FILM COATED, EXTENDED RELEASE ORAL
Qty: 30 TABLET | Refills: 3 | Status: SHIPPED | OUTPATIENT
Start: 2017-11-14 | End: 2018-04-16

## 2018-01-04 ENCOUNTER — PRE VISIT (OUTPATIENT)
Dept: UROLOGY | Facility: CLINIC | Age: 38
End: 2018-01-04

## 2018-01-10 ENCOUNTER — PRE VISIT (OUTPATIENT)
Dept: UROLOGY | Facility: CLINIC | Age: 38
End: 2018-01-10

## 2018-02-07 ENCOUNTER — PRE VISIT (OUTPATIENT)
Dept: UROLOGY | Facility: CLINIC | Age: 38
End: 2018-02-07

## 2018-03-05 ENCOUNTER — PRE VISIT (OUTPATIENT)
Dept: UROLOGY | Facility: CLINIC | Age: 38
End: 2018-03-05

## 2018-03-05 NOTE — TELEPHONE ENCOUNTER
Pt with a neurogenic bladder coming in to review UDS and for a cystoscopy. Chart reviewed. No need for a call.

## 2018-03-08 ENCOUNTER — OFFICE VISIT (OUTPATIENT)
Dept: UROLOGY | Facility: CLINIC | Age: 38
End: 2018-03-08
Payer: COMMERCIAL

## 2018-03-08 ENCOUNTER — RADIANT APPOINTMENT (OUTPATIENT)
Dept: RADIOLOGY | Facility: AMBULATORY SURGERY CENTER | Age: 38
End: 2018-03-08
Attending: PHYSICIAN ASSISTANT
Payer: COMMERCIAL

## 2018-03-08 VITALS
BODY MASS INDEX: 35.6 KG/M2 | HEIGHT: 71 IN | WEIGHT: 254.3 LBS | SYSTOLIC BLOOD PRESSURE: 135 MMHG | HEART RATE: 76 BPM | DIASTOLIC BLOOD PRESSURE: 84 MMHG

## 2018-03-08 DIAGNOSIS — R39.15 URINARY URGENCY: Primary | ICD-10-CM

## 2018-03-08 DIAGNOSIS — R32 URINARY INCONTINENCE: ICD-10-CM

## 2018-03-08 DIAGNOSIS — R33.9 INCOMPLETE BLADDER EMPTYING: ICD-10-CM

## 2018-03-08 DIAGNOSIS — R39.11 URINARY HESITANCY: ICD-10-CM

## 2018-03-08 LAB
APPEARANCE UR: CLEAR
BILIRUB UR QL: NORMAL
COLOR UR: YELLOW
GLUCOSE URINE: NORMAL MG/DL
HGB UR QL: NORMAL
KETONES UR QL: NORMAL MG/DL
LEUKOCYTE ESTERASE URINE: NORMAL
NITRITE UR QL STRIP: NORMAL
PH UR STRIP: 5.5 PH (ref 5–7)
PROTEIN ALBUMIN URINE: NORMAL MG/DL
SOURCE: NORMAL
SP GR UR STRIP: 1.02 (ref 1–1.03)
UROBILINOGEN UR QL STRIP: 0.2 EU/DL (ref 0.2–1)

## 2018-03-08 ASSESSMENT — PAIN SCALES - GENERAL: PAINLEVEL: NO PAIN (0)

## 2018-03-08 NOTE — LETTER
3/8/2018       RE: Alcides Velazquez  [address]  [place] [address]     Dear Colleague,    Thank you for referring your patient, Alcides Velazquez, to the [facility] UROLOGY AND INST FOR PROSTATE AND UROLOGIC CANCERS at [facility]. Please see a copy of my visit note below.    PREPROCEDURE DIAGNOSES:    1. History of bulbar urethral stricture s/p TUIBN/TUIP in 10/2017  2. History of GBS with ongoing urinary frequency, urgency, and incomplete bladder emptying    POSTPROCEDURE DIAGNOSES:  1. 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 Incomplete bladder emptying  3. Suggestion for bladder outlet obstruction  4. UDS shows:  -Several episodes of DO without incontinence starting at bladder volumes >330 mL with pressures ranging from 26-62 cm H2O.  -Fair/poor bladder compliance (compliance ratio 10)  -jail of 554 mL  -Sensations: normal  -Incontinence: no DOI or HERMAN  -Detrusor activity during voiding: strong detrusor contraction to 66.7 cm H2O  -Flow: slow flow (Qmax 10.9 ml/s) with incomplete bladder emptying ( mL) and an obstructed/interrupted flow curve.  -Detrusor sphincter dyssenergia:  there is mild increased EMG activity during voiding and during UDC's - possibly suggestive of some DESD as well.  -Outlet obstruction: BOOI is 21.9 which is equivocal for obstruction. However, high pressure/slow flow voiding pattern is suggestive of KOENIG. On fluoro, there is a questionable area of narrowing in the distal part of the visualized urethra.  -Fluoroscopy otherwise reveals a smooth-walled bladder without diverticuli or VUR. Bladder neck is closed during filling and open during voiding.     PROCEDURE:    1. Uroflowmetry.  2. Sterile urethral catheterization for measurement of postvoid residual urine volume.  3. Complex filling cystometrogram with measurement of bladder and rectal pressures.  4. Complex voiding cystometrogram with measurement of bladder and rectal pressures.  5.  Electromyography of the pelvic floor during urodynamics.  6. Fluoroscopic imaging of the bladder during urodynamics, at least 3 views.    7. Interpretation of urodynamics and flouroscopic imaging.      INDICATIONS FOR PROCEDURE:  Mr. Alcides Velazquez is a pleasant 37 year old male with a history of GBS which resulted in urinary frequency, urgency, and incomplete bladder emptying, as well as a history of bulbar urethral stricture s/p TUIBN/TUIP in 10/2017. Repeat video urodynamic assessment is requested today by Dr. Cadet to better characterize Mr. Alcides Velazquez's voiding dysfunction.      VOIDING DIARY:  Voids every 1 hour during the day, nocturia x 3-4.  Episodes of incontinence: none, but reports urgency.  Incontinence associated with: n/a.  Total Volume Intake: moderate amount of water, no coffee, rare soda  Total Volume Output: unknown    DESCRIPTION OF PROCEDURE:  Risks, benefits, and alternatives to urodynamics were discussed with the patient and he wished to proceed.  Urodynamics are planned to better assess the primary etiology for Mr. Velazquez's urologic dysfunction.  The patient last took anticholinergic medication 5.5 hours ago.  After informed consent was obtained, the patient was taken to the procedure room where uroflowmetry was performed. Findings below.     PRE-STUDY UROFLOWMETRY:  Voided volume: 192 mL.  Maximum flow rate: 9.2 mL/sec.  Average flow rate: 3.6 mL/sec.  Character of the curve: blunted and intermittent.  Postvoid residual by catheter: 225 mL.  Pretest urine dipstick was negative for leukocytes and nitrites.    Next a 7F double-lumen urodynamics catheter was inserted into the bladder under sterile technique via native urethra.  A 7F abdominal manometry catheter was placed in the rectum.  EMG pads were placed on both sides of the anal verge.  The bladder was filled with 200 mL of Cystografin at 25 mL/minute and serial pressures were recorded.  With coughing there was an appropriate rise in  vesical and abdominal pressures with no change in detrusor pressure, confirming good study catheter placement.    DURING THE FILLING PHASE:  First sensation: 175 mL.  First Desire: 176 mL.  Strong Desire: 256 mL.  Maximum Capacity: 554 mL.    Uninhibited detrusor contractions: several episodes of DO without incontinence starting at bladder volumes >330 mL.  Compliance: fair/poor. PDet=56 cmH20 at capacity. Compliance ratio of 10.  Continence: no HERMAN or DOI  EMG: concordant during filling.    DURING THE VOIDING PHASE:  Maximum detrusor contraction with void: 66.7 cm of H2O pressure.  Voided volume: 257 mL.  Maximum flow rate: 10.9 mL/sec.  Average flow rate: 3.0 mL/sec.  Postvoid Residual: 295 mL.  Detrusor sphincter dyssynergia: there is increased EMG activity during voiding which correlates with detrusor contractions - possibly suggestive of some DESD.  Character of voiding curve: interrupted.  BOOI: 21.9 ( equivocal for obstruction - see key below)  [obstructed (KOENIG index [BOOI] ? 40); equivocal (no definite   obstruction; BOOI 20-40); and no obstruction (BOOI ? 20)]    FLUOROSCOPIC IMAGING OF THE BLADDER DURING URODYNAMICS:  Fluoroscopy during today's procedure demonstrated a smooth walled bladder without diverticulae or cellules.  No vesicoureteral reflux was observed.  The bladder neck was closed during filling and open during voiding. There is an area of questionable narrowing in the distal part of the visualized urethra. After voiding to sense of completion, all catheters were removed and the patient was brought back into the consultation room to further discuss today's study results.      ASSESSMENT/PLAN:  Mr. Alcides Velazquez is a pleasant 37 year old male with a history of GBS which resulted in urinary frequency, urgency, and incomplete bladder emptying, as well as a history of bulbar urethral stricture s/p TUIBN/TUIP in 10/2017 who demonstrated the following findings today on urodynamic  evaluation:    -Several episodes of DO without incontinence starting at bladder volumes >330 mL with pressures ranging from 26-62 cm H2O.  -Fair/poor bladder compliance (compliance ratio 10)  -alf of 554 mL  -Sensations: normal  -Incontinence: no DOI or HERMAN  -Detrusor activity during voiding: strong detrusor contraction to 66.7 cm H2O  -Flow: slow flow (Qmax 10.9 ml/s) with incomplete bladder emptying ( mL) and an obstructed/interrupted flow curve  -Detrusor sphincter dyssenergia:  there is mild increased EMG activity during voiding and during UDC's - possibly suggestive of some DESD.  -Outlet obstruction: BOOI is 21.9 which is equivocal for obstruction. However, high pressure/slow flow voiding pattern is suggestive of KOENIG. On fluoro, there is a questionable area of narrowing in the distal part of the visualized urethra.  -Fluoroscopy otherwise reveals a smooth-walled bladder without diverticuli or VUR. Bladder neck is closed during filling and open during voiding.     The patient will follow up as scheduled with Dr. Cadet for cystoscopy and to further discuss today's study results and make plans for how best to proceed.      - A single Cipro antibiotic was provided for UTI prophylaxis following completion of today's study per department protocol.  The risk of UTI with VUDS is low at ~2.5-3%.      Thank you for allowing me to participate in the care of Mr. Alcides Velazquez and please don't hesitate to contact me with any questions or concerns.        Again, thank you for allowing me to participate in the care of your patient.      Sincerely,    Lilliana Avalos PA-C

## 2018-03-08 NOTE — PROGRESS NOTES
PREPROCEDURE DIAGNOSES:    1. History of bulbar urethral stricture s/p TUIBN/TUIP in 10/2017  2. History of GBS with ongoing urinary frequency, urgency, and incomplete bladder emptying    POSTPROCEDURE DIAGNOSES:  1. 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 Incomplete bladder emptying  3. Suggestion for bladder outlet obstruction  4. UDS shows:  -Several episodes of DO without incontinence starting at bladder volumes >330 mL with pressures ranging from 26-62 cm H2O.  -Fair/poor bladder compliance (compliance ratio 10)  -MCFP of 554 mL  -Sensations: normal  -Incontinence: no DOI or HERMAN  -Detrusor activity during voiding: strong detrusor contraction to 66.7 cm H2O  -Flow: slow flow (Qmax 10.9 ml/s) with incomplete bladder emptying ( mL) and an obstructed/interrupted flow curve.  -Detrusor sphincter dyssenergia:  there is mild increased EMG activity during voiding and during UDC's - possibly suggestive of some DESD as well.  -Outlet obstruction: BOOI is 21.9 which is equivocal for obstruction. However, high pressure/slow flow voiding pattern is suggestive of KOENIG. On fluoro, there is a questionable area of narrowing in the distal part of the visualized urethra.  -Fluoroscopy otherwise reveals a smooth-walled bladder without diverticuli or VUR. Bladder neck is closed during filling and open during voiding.     PROCEDURE:    1. Uroflowmetry.  2. Sterile urethral catheterization for measurement of postvoid residual urine volume.  3. Complex filling cystometrogram with measurement of bladder and rectal pressures.  4. Complex voiding cystometrogram with measurement of bladder and rectal pressures.  5. Electromyography of the pelvic floor during urodynamics.  6. Fluoroscopic imaging of the bladder during urodynamics, at least 3 views.    7. Interpretation of urodynamics and flouroscopic imaging.      INDICATIONS FOR PROCEDURE:  Mr. Alcides Velazquez is a pleasant 37 year old male with a history of GBS which resulted  in urinary frequency, urgency, and incomplete bladder emptying, as well as a history of bulbar urethral stricture s/p TUIBN/TUIP in 10/2017. Repeat video urodynamic assessment is requested today by Dr. Cadet to better characterize Mr. Alcides Velazquez's voiding dysfunction.      VOIDING DIARY:  Voids every 1 hour during the day, nocturia x 3-4.  Episodes of incontinence: none, but reports urgency.  Incontinence associated with: n/a.  Total Volume Intake: moderate amount of water, no coffee, rare soda  Total Volume Output: unknown    DESCRIPTION OF PROCEDURE:  Risks, benefits, and alternatives to urodynamics were discussed with the patient and he wished to proceed.  Urodynamics are planned to better assess the primary etiology for Mr. Velazquez's urologic dysfunction.  The patient last took anticholinergic medication 5.5 hours ago.  After informed consent was obtained, the patient was taken to the procedure room where uroflowmetry was performed. Findings below.     PRE-STUDY UROFLOWMETRY:  Voided volume: 192 mL.  Maximum flow rate: 9.2 mL/sec.  Average flow rate: 3.6 mL/sec.  Character of the curve: blunted and intermittent.  Postvoid residual by catheter: 225 mL.  Pretest urine dipstick was negative for leukocytes and nitrites.    Next a 7F double-lumen urodynamics catheter was inserted into the bladder under sterile technique via native urethra.  A 7F abdominal manometry catheter was placed in the rectum.  EMG pads were placed on both sides of the anal verge.  The bladder was filled with 200 mL of Cystografin at 25 mL/minute and serial pressures were recorded.  With coughing there was an appropriate rise in vesical and abdominal pressures with no change in detrusor pressure, confirming good study catheter placement.    DURING THE FILLING PHASE:  First sensation: 175 mL.  First Desire: 176 mL.  Strong Desire: 256 mL.  Maximum Capacity: 554 mL.    Uninhibited detrusor contractions: several episodes of DO without  incontinence starting at bladder volumes >330 mL.  Compliance: fair/poor. PDet=56 cmH20 at capacity. Compliance ratio of 10.  Continence: no HERMAN or DOI  EMG: concordant during filling.    DURING THE VOIDING PHASE:  Maximum detrusor contraction with void: 66.7 cm of H2O pressure.  Voided volume: 257 mL.  Maximum flow rate: 10.9 mL/sec.  Average flow rate: 3.0 mL/sec.  Postvoid Residual: 295 mL.  Detrusor sphincter dyssynergia: there is increased EMG activity during voiding which correlates with detrusor contractions - possibly suggestive of some DESD.  Character of voiding curve: interrupted.  BOOI: 21.9 ( equivocal for obstruction - see key below)  [obstructed (KOENIG index [BOOI] ? 40); equivocal (no definite   obstruction; BOOI 20-40); and no obstruction (BOOI ? 20)]    FLUOROSCOPIC IMAGING OF THE BLADDER DURING URODYNAMICS:  Fluoroscopy during today's procedure demonstrated a smooth walled bladder without diverticulae or cellules.  No vesicoureteral reflux was observed.  The bladder neck was closed during filling and open during voiding. There is an area of questionable narrowing in the distal part of the visualized urethra. After voiding to sense of completion, all catheters were removed and the patient was brought back into the consultation room to further discuss today's study results.      ASSESSMENT/PLAN:  Mr. Alcides Velazquez is a pleasant 37 year old male with a history of GBS which resulted in urinary frequency, urgency, and incomplete bladder emptying, as well as a history of bulbar urethral stricture s/p TUIBN/TUIP in 10/2017 who demonstrated the following findings today on urodynamic evaluation:    -Several episodes of DO without incontinence starting at bladder volumes >330 mL with pressures ranging from 26-62 cm H2O.  -Fair/poor bladder compliance (compliance ratio 10)  -shelter of 554 mL  -Sensations: normal  -Incontinence: no DOI or HERMAN  -Detrusor activity during voiding: strong detrusor contraction to 66.7  cm H2O  -Flow: slow flow (Qmax 10.9 ml/s) with incomplete bladder emptying ( mL) and an obstructed/interrupted flow curve  -Detrusor sphincter dyssenergia:  there is mild increased EMG activity during voiding and during UDC's - possibly suggestive of some DESD.  -Outlet obstruction: BOOI is 21.9 which is equivocal for obstruction. However, high pressure/slow flow voiding pattern is suggestive of KOENIG. On fluoro, there is a questionable area of narrowing in the distal part of the visualized urethra.  -Fluoroscopy otherwise reveals a smooth-walled bladder without diverticuli or VUR. Bladder neck is closed during filling and open during voiding.     The patient will follow up as scheduled with Dr. Cadet for cystoscopy and to further discuss today's study results and make plans for how best to proceed.      - A single Cipro antibiotic was provided for UTI prophylaxis following completion of today's study per department protocol.  The risk of UTI with VUDS is low at ~2.5-3%.      Thank you for allowing me to participate in the care of . Alcides Velazquez and please don't hesitate to contact me with any questions or concerns.      Lilliana Avalos PA-C  Urology Physician Assistant

## 2018-03-08 NOTE — MR AVS SNAPSHOT
After Visit Summary   3/8/2018    Alcides Velazquez    MRN: [id number]           Patient Information     Date Of Birth          1980        Visit Information        Provider Department      3/8/2018 1:00 PM Lilliana Avalos PA-C [facility] Urology and [facility] [place] for Prostate and Urologic Cancers        Today's Diagnoses     Urinary urgency    -  1    Urinary hesitancy        Incomplete bladder emptying           Follow-ups after your visit        Your next 10 appointments already scheduled     Mar 12, 2018  9:30 AM CDT   (Arrive by 9:15 AM)   Cystoscopy with Chito Cadet MD   [facility] Urology and [facility] [place] for Prostate and Urologic Cancers ([facility] and [facility])    [address] [address] [phone]              Who to contact     Please call your clinic at [phone] to:    Ask questions about your health    Make or cancel appointments    Discuss your medicines    Learn about your test results    Speak to your doctor            Additional Information About Your Visit        MyChart Information     "[employer]" is an electronic gateway that provides easy, online access to your medical records. With "[employer]", you can request a clinic appointment, read your test results, renew a prescription or communicate with your care team.     To sign up for [employer] visit the website at [email or web address]   You will be asked to enter the access code listed below, as well as some personal information. Please follow the directions to create your username and password.     Your access code is: [email or web address]  Expires: 2018  6:30 AM     Your access code will  in 90 days. If you need help or a new code, please contact your [facility] or call [phone] for assistance.        Care EveryWhere ID     This is your Care EveryWhere ID. This could be used by other organizations to access your [facility]  "records  [id number]        Your Vitals Were     Pulse Height BMI (Body Mass Index)             76 1.803 m (5' 11\") 35.47 kg/m2          Blood Pressure from Last 3 Encounters:   03/08/18 135/84   11/13/17 135/84   10/27/17 115/71    Weight from Last 3 Encounters:   03/08/18 115.3 kg (254 lb 4.8 oz)   11/13/17 111.6 kg (246 lb)   10/27/17 110.2 kg (243 lb)              We Performed the Following     COMPLEX UROFLOWMETRY     CYSTOMETROGRAM COMPLEX W VOIDING PRESS PROFILE     EMG ANAL/URETH SPHINCTER, OTHER THAN NEEDLE     HC CONTRAST ISOVUE 370 MG/ML, PER ML     Urinalysis chemical screen POCT     VOIDING PRESSURE STUDY, INTRA-ABDOMINAL        Primary Care Provider Office Phone # Fax #    Ismael Yan [phone]       [facility] [address] [facility] [address]        Equal Access to Services     KRYSTAL GOULD AH: Hadii aad ku hadasho Soomaali, waaxda luqadaha, qaybta kaalmada adeegyada, selwyn bautista . So [facility] [phone].    ATENCIÓN: Si habla tate, tiene a cortes disposición servicios gratuitos de asistencia lingüística. Llame al [phone].    We comply with applicable federal civil rights laws and [place] laws. We do not discriminate on the basis of race, color, national origin, age, disability, sex, sexual orientation, or gender identity.            Thank you!     Thank you for choosing [facility] UROLOGY AND [facility] FOR PROSTATE AND UROLOGIC CANCERS  for your care. Our goal is always to provide you with excellent care. Hearing back from our patients is one way we can continue to improve our services. Please take a few minutes to complete the written survey that you may receive in the mail after your visit with us. Thank you!             Your Updated Medication List - Protect others around you: Learn how to safely use, store and throw away your medicines at www.disposemymeds.org.          This list is accurate as of 3/8/18  2:33 PM.  Always use your most " recent med list.                   Brand Name Dispense Instructions for use Diagnosis    * ALPRAZolam 0.5 MG tablet    XANAX     Take 0.5 mg by mouth daily as needed        * ALPRAZolam 0.5 MG tablet    XANAX     Take 0.5 mg by mouth        FLUoxetine 40 MG capsule    PROzac     Take 40 mg by mouth daily        levofloxacin 500 MG tablet    LEVAQUIN    4 tablet    Take 1 tablet (500 mg) by mouth daily    Urethral stricture, unspecified       melatonin 5 MG Caps      Take 5 mg by mouth daily        MYRBETRIQ 25 MG 24 hr tablet   Generic drug:  mirabegron     30 tablet    TAKE ONE TABLET BY MOUTH ONCE DAILY    Overactive bladder       naproxen sodium 220 MG tablet    ANAPROX     Take 220 mg by mouth daily        NIFEdipine ER osmotic 30 MG 24 hr tablet    PROCARDIA XL     Take 30 mg by mouth daily        polyethylene glycol powder    MIRALAX/GLYCOLAX     MIX 17 GRAMS (1 CAPFUL) IN LIQUID AND DRINK TWICE DAILY        tamsulosin 0.4 MG capsule    FLOMAX     Take 0.4 mg by mouth daily        * traMADol 50 MG tablet    ULTRAM     Take  mg by mouth nightly as needed        * traMADol 50 MG tablet    ULTRAM     TAKE ONE TO TWO TABLETS BY MOUTH AT BEDTIME AS NEEDED FOR PAIN *CAUTION: OPIOID. RISK OF OVERDOSE AND ADDICTION.        * Notice:  This list has 4 medication(s) that are the same as other medications prescribed for you. Read the directions carefully, and ask your doctor or other care provider to review them with you.

## 2018-03-12 ENCOUNTER — OFFICE VISIT (OUTPATIENT)
Dept: UROLOGY | Facility: CLINIC | Age: 38
End: 2018-03-12
Payer: COMMERCIAL

## 2018-03-12 VITALS
BODY MASS INDEX: 35.55 KG/M2 | HEIGHT: 71 IN | DIASTOLIC BLOOD PRESSURE: 81 MMHG | SYSTOLIC BLOOD PRESSURE: 136 MMHG | HEART RATE: 69 BPM | WEIGHT: 253.9 LBS

## 2018-03-12 DIAGNOSIS — N32.81 OVERACTIVE BLADDER: ICD-10-CM

## 2018-03-12 DIAGNOSIS — N32.0 BLADDER NECK OBSTRUCTION: Primary | ICD-10-CM

## 2018-03-12 RX ORDER — MIRABEGRON 50 MG/1
50 TABLET, EXTENDED RELEASE ORAL DAILY
Qty: 90 TABLET | Refills: 3 | Status: SHIPPED | OUTPATIENT
Start: 2018-03-12 | End: 2018-04-16 | Stop reason: DRUGHIGH

## 2018-03-12 ASSESSMENT — PAIN SCALES - GENERAL: PAINLEVEL: NO PAIN (0)

## 2018-03-12 NOTE — MR AVS SNAPSHOT
After Visit Summary   3/12/2018    Alcides Velazquez    MRN: [id number]           Patient Information     Date Of Birth          1980        Visit Information        Provider Department      3/12/2018 9:30 AM Chito Cadet MD [facility] Urology and [facility] for Prostate and Urologic Cancers        Care Instructions    Stella (Dr. Cadet's care coordinator) will call in one month to do a symptom check on the increased dose on your medication (Myrbetriq).    It was a pleasure meeting with you today.  Thank you for allowing me and my team the privilege of caring for you today.  YOU are the reason we are here, and I truly hope we provided you with the excellent service you deserve.  Please let us know if there is anything else we can do for you so that we can be sure you are leaving completely satisfied with your care experience.               Follow-ups after your visit        Who to contact     Please call your clinic at [phone] to:    Ask questions about your health    Make or cancel appointments    Discuss your medicines    Learn about your test results    Speak to your doctor            Additional Information About Your Visit        [employer] Information     [employer] is an electronic gateway that provides easy, online access to your medical records. With [employer], you can request a clinic appointment, read your test results, renew a prescription or communicate with your care team.     To sign up for [employer] visit the website at [email or web address]   You will be asked to enter the access code listed below, as well as some personal information. Please follow the directions to create your username and password.     Your access code is: [email or web address]  Expires: 2018  7:30 AM     Your access code will  in 90 days. If you need help or a new code, please contact your [facility] or call [phone] for assistance.        Care EveryWhere ID     This is your  "Care EveryWhere ID. This could be used by other organizations to access your [place] medical records  [id number]        Your Vitals Were     Pulse Height BMI (Body Mass Index)             69 1.803 m (5' 11\") 35.41 kg/m2          Blood Pressure from Last 3 Encounters:   03/12/18 136/81   03/08/18 135/84   11/13/17 135/84    Weight from Last 3 Encounters:   03/12/18 115.2 kg (253 lb 14.4 oz)   03/08/18 115.3 kg (254 lb 4.8 oz)   11/13/17 111.6 kg (246 lb)              Today, you had the following     No orders found for display       Primary Care Provider Office Phone # Fax #    Ismael Yan [phone]       [facility] [address] [facility] [address]        Equal Access to Services     KRYSTAL GOULD : Hadii abhi joe Sodavid, waaxda luqdilia, qaybta kaalmada geneva, selwyn bautista . So [facility] [phone].    ATENCIÓN: Si habla español, tiene a cortes disposición servicios gratuitos de asistencia lingüística. Zeke al [phone].    We comply with applicable federal civil rights laws and [place] laws. We do not discriminate on the basis of race, color, national origin, age, disability, sex, sexual orientation, or gender identity.            Thank you!     Thank you for choosing [facility] UROLOGY AND [facility] FOR PROSTATE AND UROLOGIC CANCERS  for your care. Our goal is always to provide you with excellent care. Hearing back from our patients is one way we can continue to improve our services. Please take a few minutes to complete the written survey that you may receive in the mail after your visit with us. Thank you!             Your Updated Medication List - Protect others around you: Learn how to safely use, store and throw away your medicines at www.disposemymeds.org.          This list is accurate as of 3/12/18  9:56 AM.  Always use your most recent med list.                   Brand Name Dispense Instructions for use Diagnosis    * ALPRAZolam 0.5 MG " tablet    XANAX     Take 0.5 mg by mouth daily as needed        * ALPRAZolam 0.5 MG tablet    XANAX     Take 0.5 mg by mouth        FLUoxetine 40 MG capsule    PROzac     Take 40 mg by mouth daily        levofloxacin 500 MG tablet    LEVAQUIN    4 tablet    Take 1 tablet (500 mg) by mouth daily    Urethral stricture, unspecified       melatonin 5 MG Caps      Take 5 mg by mouth daily        MYRBETRIQ 25 MG 24 hr tablet   Generic drug:  mirabegron     30 tablet    TAKE ONE TABLET BY MOUTH ONCE DAILY    Overactive bladder       naproxen sodium 220 MG tablet    ANAPROX     Take 220 mg by mouth daily        NIFEdipine ER osmotic 30 MG 24 hr tablet    PROCARDIA XL     Take 30 mg by mouth daily        polyethylene glycol powder    MIRALAX/GLYCOLAX     MIX 17 GRAMS (1 CAPFUL) IN LIQUID AND DRINK TWICE DAILY        tamsulosin 0.4 MG capsule    FLOMAX     Take 0.4 mg by mouth daily        * traMADol 50 MG tablet    ULTRAM     Take  mg by mouth nightly as needed        * traMADol 50 MG tablet    ULTRAM     TAKE ONE TO TWO TABLETS BY MOUTH AT BEDTIME AS NEEDED FOR PAIN *CAUTION: OPIOID. RISK OF OVERDOSE AND ADDICTION.        * Notice:  This list has 4 medication(s) that are the same as other medications prescribed for you. Read the directions carefully, and ask your doctor or other care provider to review them with you.

## 2018-03-12 NOTE — PROGRESS NOTES
Male Cystoscopy Procedure Note     PRE-PROCEDURE DIAGNOSIS: urinary urgency   POST-PROCEDURE DIAGNOSIS: same   PROCEDURE: cystoscopy    HISTORY: Alcides Velazquez is a 37 year old man with voiding dysfunction.  He had Guillan Barré syndrome couple of years ago and developed new onset voiding dysfunction.  He previously was managed with Botox and anticholinergics and did not get better.  He then saw me and we discovered a bulbar urethral stricture which we opened with an internal urethrotomy about 6 months ago.  His voiding dysfunction persisted after opening the bulbar stricture and so then we proceeded with transurethral incision of the bladder neck because urodynamics had confirmed bladder neck obstruction.  His symptoms are slightly better but still quite bothersome.  He complains of urinary urgency and frequency and he complains less of a slow urinary stream.      REVIEW OF OFFICE STUDIES:    Repeat urodynamics after the transurethral incision of the bladder neck demonstrates a wide open bladder neck, detrusor overactivity and bladder outlet obstruction.  The detrusor overactivity shows up once he gets to bladder volumes of about 250 cc and the spasms are very frequent.  The bladder outlet obstruction is characterized by detrusor contraction and 67 cm of water with a flow rate of only 10 cc/s.  The fluoroscopic imaging during the voiding study demonstrates some funneling of the contrast in the area of the very proximal bulbar urethra or sphincter.    DESCRIPTION OF PROCEDURE:  After informed consent was obtained, the patient was brought to the procedure room where he was placed in the supine position with all pressure points well padded.  The penis and scrotum were prepped and draped in a sterile fashion. A flexible cystoscope was introduced through a well-lubricated urethra.  The anterior urethra was free of stricture, ; specifically the former bulbar stricture was absent. The urinary sphincter was intact and  normal tone. The prostate demonstrated small size. Bladder neck was open. Bladder was free of diverticuli, cellules, trabeculation, tumors or stones.The flexible cystoscope was removed and the findings were described to the patient.   ASSESSMENT AND PLAN:  37 year old man with persistent voiding dysfunction after the above-named procedures.  Based on repeat urodynamics he still seems to have some overactive neurogenic bladder; we will increase his Myrbetriq and continue his Flomax.  He will call us again in a month and if things are not better on the higher dose of Myrbetriq then we will schedule Botox injection of the bladder.  My hope is that of a better result with Botox injection the bladder now that his bladder neck and bulbar stricture open.

## 2018-03-12 NOTE — NURSING NOTE
Chief Complaint   Patient presents with     Cystoscopy     Pt with a neurogenic bladder coming in to review UDS and for a cystoscopy.      Sabina Upton

## 2018-03-12 NOTE — NURSING NOTE
The following medication was given:     MEDICATION: Uro-jet   ROUTE: Applied topically via urethral meatus   SITE: urethra   DOSE: 10 mL  LOT #: [id number]  :  [employer]  EXPIRATION DATE:  10-19  NDC#: [address]-0

## 2018-03-12 NOTE — NURSING NOTE
Invasive Procedure Safety Checklist:    Procedure:     Action: Complete sections and checkboxes as appropriate.    Pre-procedure:  1. Patient ID Verified with 2 identifiers ([place]oma and  or MRN) : YES    2. Procedure and site verified with patient/designee (when able) : YES    3. Accurate consent documentation in medical record : YES    4. H&P (or appropriate assessment) documented in medical record : YES  H&P must be up to 30 days prior to procedure an updated within 24 hours of                 Procedure as applicable.     5. Relevant diagnostic and radiology test results appropriately labeled and displayed as applicable : YES    6. Blood products, implants, devices, and/or special equipment available for the procedure as applicable : YES    7. Procedure site(s) marked with provider initials [Exclusions: N/A] : NO    8. Marking not required. Reason : Yes  Procedure does not require site marking    Time Out:     Time-Out performed immediately prior to starting procedure, including verbal and active participation of all team members addressing: YES    1. Correct patient identity.  2. Confirmed that the correct side and site are marked.  3. An accurate procedure to be done.  4. Agreement on the procedure to be done.  5. Correct patient position.  6. Relevant images and results are properly labeled and appropriately displayed.  7. The need to administer antibiotics or fluids for irrigation purposes during the procedure as applicable.  8. Safety precautions based on patient history or medication use.    During Procedure: Verification of correct person, site, and procedure occurs any time the responsibility for care of the patient is transferred to another member of the care team.

## 2018-03-12 NOTE — LETTER
3/12/2018     RE: Alcides Velazquez  [address]  [place] [address]     Dear Colleague,    Thank you for referring your patient, Alcides Velazquez, to the [facility] UROLOGY AND INST FOR PROSTATE AND UROLOGIC CANCERS at [facility]. Please see a copy of my visit note below.    Male Cystoscopy Procedure Note     PRE-PROCEDURE DIAGNOSIS: urinary urgency   POST-PROCEDURE DIAGNOSIS: same   PROCEDURE: cystoscopy    HISTORY: Alcides Velazquez is a 37 year old man with voiding dysfunction.  He had Guillan Barré syndrome couple of years ago and developed new onset voiding dysfunction.  He previously was managed with Botox and anticholinergics and did not get better.  He then saw me and we discovered a bulbar urethral stricture which we opened with an internal urethrotomy about 6 months ago.  His voiding dysfunction persisted after opening the bulbar stricture and so then we proceeded with transurethral incision of the bladder neck because urodynamics had confirmed bladder neck obstruction.  His symptoms are slightly better but still quite bothersome.  He complains of urinary urgency and frequency and he complains less of a slow urinary stream.      REVIEW OF OFFICE STUDIES:    Repeat urodynamics after the transurethral incision of the bladder neck demonstrates a wide open bladder neck, detrusor overactivity and bladder outlet obstruction.  The detrusor overactivity shows up once he gets to bladder volumes of about 250 cc and the spasms are very frequent.  The bladder outlet obstruction is characterized by detrusor contraction and 67 cm of water with a flow rate of only 10 cc/s.  The fluoroscopic imaging during the voiding study demonstrates some funneling of the contrast in the area of the very proximal bulbar urethra or sphincter.    DESCRIPTION OF PROCEDURE:  After informed consent was obtained, the patient was brought to the procedure room where he was placed in the supine position with all  pressure points well padded.  The penis and scrotum were prepped and draped in a sterile fashion. A flexible cystoscope was introduced through a well-lubricated urethra.  The anterior urethra was free of stricture, ; specifically the former bulbar stricture was absent. The urinary sphincter was intact and normal tone. The prostate demonstrated small size. Bladder neck was open. Bladder was free of diverticuli, cellules, trabeculation, tumors or stones.The flexible cystoscope was removed and the findings were described to the patient.     ASSESSMENT AND PLAN:  37 year old man with persistent voiding dysfunction after the above-named procedures.  Based on repeat urodynamics he still seems to have some overactive neurogenic bladder; we will increase his Myrbetriq and continue his Flomax.  He will call us again in a month and if things are not better on the higher dose of Myrbetriq then we will schedule Botox injection of the bladder.  My hope is that of a better result with Botox injection the bladder now that his bladder neck and bulbar stricture open.    Again, thank you for allowing me to participate in the care of your patient.      Sincerely,    Chito Cadet MD

## 2018-03-12 NOTE — PATIENT INSTRUCTIONS
"Stella (Dr. Cadet's care coordinator) will call in one month to do a symptom check on the increased dose on your medication (Myrbetriq).    It was a pleasure meeting with you today.  Thank you for allowing me and my team the privilege of caring for you today.  YOU are the reason we are here, and I truly hope we provided you with the excellent service you deserve.  Please let us know if there is anything else we can do for you so that we can be sure you are leaving completely satisfied with your care experience.         AFTER YOUR CYSTOSCOPY        You have just completed a cystoscopy, or \"cysto\", which allowed your physician to learn more about your bladder (or to remove a stent placed after surgery). We suggest that you continue to avoid caffeine, fruit juice, and alcohol for the next 24 hours, however, you are encouraged to return to your normal activities.         A few things that are considered normal after your cystoscopy:     * Small amount of bleeding (or spotting) that clears within the next 24 hours     * Slight burning sensation with urination     * Sensation to of needing to avoid more frequently     * The feeling of \"air\" in your urine     * Mild discomfort that is relieved with Tylenol        Please contact our office promptly if you:     * Develop a fever above 101 degrees     * Are unable to urinate     * Develop bright red blood that does not stop     * Severe pain or swelling         Please contact our office with any concerns or questions @DEPHN.  "

## 2018-03-13 RX ORDER — MIRABEGRON 25 MG/1
TABLET, FILM COATED, EXTENDED RELEASE ORAL
Qty: 30 TABLET | Refills: 3 | OUTPATIENT
Start: 2018-03-13

## 2018-04-12 ENCOUNTER — CARE COORDINATION (OUTPATIENT)
Dept: UROLOGY | Facility: CLINIC | Age: 38
End: 2018-04-12

## 2018-04-12 NOTE — PROGRESS NOTES
Have left two voicemails for patient to call back to do a symptom check since he increased his dose of Myrbetriq.  Per Dr. Cadet:    We will increase his Myrbetriq and continue his Flomax.  He will call us again in a month and if things are not better on the higher dose of Myrbetriq then we will schedule Botox injection of the bladder.  My hope is that of a better result with Botox injection the bladder now that his bladder neck and bulbar stricture open.    Waiting to hear back from patient.    Stella Fuller RN  Care Coordinator- Reconstructive Urology

## 2018-04-16 DIAGNOSIS — N32.81 OVERACTIVE BLADDER: ICD-10-CM

## 2018-04-16 RX ORDER — MIRABEGRON 25 MG/1
25 TABLET, FILM COATED, EXTENDED RELEASE ORAL DAILY
Qty: 30 TABLET | Refills: 3 | Status: SHIPPED | OUTPATIENT
Start: 2018-04-16 | End: 2018-10-31

## 2018-04-18 ENCOUNTER — TELEPHONE (OUTPATIENT)
Dept: UROLOGY | Facility: CLINIC | Age: 38
End: 2018-04-18

## 2018-04-18 DIAGNOSIS — N31.9 NEUROGENIC BLADDER: Primary | ICD-10-CM

## 2018-04-18 RX ORDER — CEFAZOLIN SODIUM 1 G/50ML
1 INJECTION, SOLUTION INTRAVENOUS SEE ADMIN INSTRUCTIONS
Status: CANCELLED | OUTPATIENT
Start: 2018-04-18

## 2018-04-18 NOTE — TELEPHONE ENCOUNTER
Spoke to the wife surgery scheduled for 05/02/18 at 11:55am with a 10:00am check in. She is aware that he will need a pre-op. Surgery packet being mailed out today.

## 2018-04-24 ENCOUNTER — CARE COORDINATION (OUTPATIENT)
Dept: UROLOGY | Facility: CLINIC | Age: 38
End: 2018-04-24

## 2018-04-24 NOTE — PROGRESS NOTES
Upcoming surgical procedure with Dr Chito Cadet on 5/2/18 at 1155, check in at 1025.   Surgery at ([facility] or [facility]): [facility]  Patient is having a Cystoscopy and Botox injection into the bladder   Patient has a responsible adult to drive home and stay with them for 24 hours: Yes- Mother and Wife  Pre-op physical completed: YES. Date-4/27/18.  PAC: No  Bowel Prep: No  Urine culture completed: YES. Date-4/27/18.  Post-operative appointment needed: No, not needed  All questions answered.    Patient verbalized understanding. No further questions.      Stella Fuller, RN  Care Coordinator - Reconstructive Urology  [phone]

## 2018-05-01 ENCOUNTER — ANESTHESIA EVENT (OUTPATIENT)
Dept: SURGERY | Facility: AMBULATORY SURGERY CENTER | Age: 38
End: 2018-05-01

## 2018-05-02 ENCOUNTER — SURGERY (OUTPATIENT)
Age: 38
End: 2018-05-02

## 2018-05-02 ENCOUNTER — HOSPITAL ENCOUNTER (OUTPATIENT)
Facility: AMBULATORY SURGERY CENTER | Age: 38
End: 2018-05-02
Attending: UROLOGY
Payer: COMMERCIAL

## 2018-05-02 ENCOUNTER — ANESTHESIA (OUTPATIENT)
Dept: SURGERY | Facility: AMBULATORY SURGERY CENTER | Age: 38
End: 2018-05-02

## 2018-05-02 VITALS
SYSTOLIC BLOOD PRESSURE: 135 MMHG | HEIGHT: 71 IN | OXYGEN SATURATION: 96 % | HEART RATE: 60 BPM | RESPIRATION RATE: 16 BRPM | DIASTOLIC BLOOD PRESSURE: 76 MMHG | BODY MASS INDEX: 35.42 KG/M2 | WEIGHT: 253 LBS | TEMPERATURE: 97.2 F

## 2018-05-02 DIAGNOSIS — N32.81 OVERACTIVE BLADDER: Primary | ICD-10-CM

## 2018-05-02 RX ORDER — SODIUM CHLORIDE, SODIUM LACTATE, POTASSIUM CHLORIDE, CALCIUM CHLORIDE 600; 310; 30; 20 MG/100ML; MG/100ML; MG/100ML; MG/100ML
INJECTION, SOLUTION INTRAVENOUS CONTINUOUS
Status: DISCONTINUED | OUTPATIENT
Start: 2018-05-02 | End: 2018-05-02 | Stop reason: HOSPADM

## 2018-05-02 RX ORDER — LIDOCAINE 40 MG/G
CREAM TOPICAL
Status: DISCONTINUED | OUTPATIENT
Start: 2018-05-02 | End: 2018-05-02 | Stop reason: HOSPADM

## 2018-05-02 RX ORDER — ONDANSETRON 2 MG/ML
4 INJECTION INTRAMUSCULAR; INTRAVENOUS EVERY 30 MIN PRN
Status: DISCONTINUED | OUTPATIENT
Start: 2018-05-02 | End: 2018-05-03 | Stop reason: HOSPADM

## 2018-05-02 RX ORDER — ACETAMINOPHEN 325 MG/1
325-650 TABLET ORAL EVERY 6 HOURS PRN
Qty: 25 TABLET | Refills: 0 | Status: SHIPPED | OUTPATIENT
Start: 2018-05-02

## 2018-05-02 RX ORDER — ONDANSETRON 4 MG/1
4 TABLET, ORALLY DISINTEGRATING ORAL EVERY 30 MIN PRN
Status: DISCONTINUED | OUTPATIENT
Start: 2018-05-02 | End: 2018-05-03 | Stop reason: HOSPADM

## 2018-05-02 RX ORDER — SODIUM CHLORIDE, SODIUM LACTATE, POTASSIUM CHLORIDE, CALCIUM CHLORIDE 600; 310; 30; 20 MG/100ML; MG/100ML; MG/100ML; MG/100ML
INJECTION, SOLUTION INTRAVENOUS CONTINUOUS
Status: DISCONTINUED | OUTPATIENT
Start: 2018-05-02 | End: 2018-05-03 | Stop reason: HOSPADM

## 2018-05-02 RX ORDER — GABAPENTIN 300 MG/1
300 CAPSULE ORAL ONCE
Status: COMPLETED | OUTPATIENT
Start: 2018-05-02 | End: 2018-05-02

## 2018-05-02 RX ORDER — ACETAMINOPHEN 325 MG/1
975 TABLET ORAL ONCE
Status: COMPLETED | OUTPATIENT
Start: 2018-05-02 | End: 2018-05-02

## 2018-05-02 RX ORDER — NALOXONE HYDROCHLORIDE 0.4 MG/ML
.1-.4 INJECTION, SOLUTION INTRAMUSCULAR; INTRAVENOUS; SUBCUTANEOUS
Status: DISCONTINUED | OUTPATIENT
Start: 2018-05-02 | End: 2018-05-03 | Stop reason: HOSPADM

## 2018-05-02 RX ORDER — FENTANYL CITRATE 50 UG/ML
INJECTION, SOLUTION INTRAMUSCULAR; INTRAVENOUS PRN
Status: DISCONTINUED | OUTPATIENT
Start: 2018-05-02 | End: 2018-05-02

## 2018-05-02 RX ORDER — ALPRAZOLAM 0.5 MG
0.5 TABLET ORAL ONCE
Status: DISCONTINUED | OUTPATIENT
Start: 2018-05-02 | End: 2018-05-03 | Stop reason: HOSPADM

## 2018-05-02 RX ORDER — PROPOFOL 10 MG/ML
INJECTION, EMULSION INTRAVENOUS CONTINUOUS PRN
Status: DISCONTINUED | OUTPATIENT
Start: 2018-05-02 | End: 2018-05-02

## 2018-05-02 RX ORDER — ONDANSETRON 2 MG/ML
INJECTION INTRAMUSCULAR; INTRAVENOUS PRN
Status: DISCONTINUED | OUTPATIENT
Start: 2018-05-02 | End: 2018-05-02

## 2018-05-02 RX ORDER — LORAZEPAM 0.5 MG/1
0.5 TABLET ORAL ONCE
Status: COMPLETED | OUTPATIENT
Start: 2018-05-02 | End: 2018-05-02

## 2018-05-02 RX ORDER — DEXAMETHASONE SODIUM PHOSPHATE 4 MG/ML
INJECTION, SOLUTION INTRA-ARTICULAR; INTRALESIONAL; INTRAMUSCULAR; INTRAVENOUS; SOFT TISSUE PRN
Status: DISCONTINUED | OUTPATIENT
Start: 2018-05-02 | End: 2018-05-02

## 2018-05-02 RX ORDER — LIDOCAINE HYDROCHLORIDE 20 MG/ML
INJECTION, SOLUTION INFILTRATION; PERINEURAL PRN
Status: DISCONTINUED | OUTPATIENT
Start: 2018-05-02 | End: 2018-05-02

## 2018-05-02 RX ORDER — PROPOFOL 10 MG/ML
INJECTION, EMULSION INTRAVENOUS PRN
Status: DISCONTINUED | OUTPATIENT
Start: 2018-05-02 | End: 2018-05-02

## 2018-05-02 RX ADMIN — ONDANSETRON 4 MG: 2 INJECTION INTRAMUSCULAR; INTRAVENOUS at 13:33

## 2018-05-02 RX ADMIN — LORAZEPAM 0.5 MG: 0.5 TABLET ORAL at 14:36

## 2018-05-02 RX ADMIN — DEXAMETHASONE SODIUM PHOSPHATE 4 MG: 4 INJECTION, SOLUTION INTRA-ARTICULAR; INTRALESIONAL; INTRAMUSCULAR; INTRAVENOUS; SOFT TISSUE at 13:33

## 2018-05-02 RX ADMIN — PROPOFOL 200 MG: 10 INJECTION, EMULSION INTRAVENOUS at 13:33

## 2018-05-02 RX ADMIN — GABAPENTIN 300 MG: 300 CAPSULE ORAL at 12:14

## 2018-05-02 RX ADMIN — FENTANYL CITRATE 50 MCG: 50 INJECTION, SOLUTION INTRAMUSCULAR; INTRAVENOUS at 13:36

## 2018-05-02 RX ADMIN — LIDOCAINE HYDROCHLORIDE 100 MG: 20 INJECTION, SOLUTION INFILTRATION; PERINEURAL at 13:33

## 2018-05-02 RX ADMIN — PROPOFOL 200 MCG/KG/MIN: 10 INJECTION, EMULSION INTRAVENOUS at 13:33

## 2018-05-02 RX ADMIN — PROPOFOL 200 MG: 10 INJECTION, EMULSION INTRAVENOUS at 13:36

## 2018-05-02 RX ADMIN — SODIUM CHLORIDE, SODIUM LACTATE, POTASSIUM CHLORIDE, CALCIUM CHLORIDE: 600; 310; 30; 20 INJECTION, SOLUTION INTRAVENOUS at 12:14

## 2018-05-02 RX ADMIN — ACETAMINOPHEN 975 MG: 325 TABLET ORAL at 12:14

## 2018-05-02 NOTE — ANESTHESIA PREPROCEDURE EVALUATION
Anesthesia Evaluation     . Pt has had prior anesthetic. Type: General    History of anesthetic complications   - PONV        ROS/MED HX    ENT/Pulmonary:  - neg pulmonary ROS     Neurologic: Comment: Guillain barre syndrome      Cardiovascular:     (+) hypertension----. : . . fainting (syncope). :. .       METS/Exercise Tolerance:  4 - Raking leaves, gardening   Hematologic:  - neg hematologic  ROS       Musculoskeletal:  - neg musculoskeletal ROS       GI/Hepatic:  - neg GI/hepatic ROS       Renal/Genitourinary:  - ROS Renal section negative       Endo:     (+) Obesity, .      Psychiatric:  - neg psychiatric ROS       Infectious Disease:  - neg infectious disease ROS       Malignancy:      - no malignancy   Other:    - neg other ROS                 Physical Exam  Normal systems: dental    Airway   Mallampati: I  TM distance: >3 FB  Neck ROM: full    Dental     Cardiovascular   Rhythm and rate: regular and normal      Pulmonary    breath sounds clear to auscultation                    Anesthesia Plan      History & Physical Review  History and physical reviewed and following examination; no interval change.    ASA Status:  3 .    NPO Status:  > 6 hours    Plan for General and LMA with Intravenous induction. Maintenance will be TIVA.    PONV prophylaxis:  Ondansetron (or other 5HT-3) and Dexamethasone or Solumedrol  Propofol infusion      Postoperative Care  Postoperative pain management:  Oral pain medications and Multi-modal analgesia.      Consents  Anesthetic plan, risks, benefits and alternatives discussed with:  Patient..                          .

## 2018-05-02 NOTE — ANESTHESIA POSTPROCEDURE EVALUATION
Patient: Alcides Velazquez    Procedure(s):  Cystoscopy and Botox Injection into the Bladder  **Latex Allergy** - Wound Class: I-Clean   - Wound Class: II-Clean Contaminated    Diagnosis:Neurogenic Bladder  Diagnosis Additional Information: No value filed.    Anesthesia Type:  General, LMA    Note:  Anesthesia Post Evaluation    Patient location during evaluation: bedside  Patient participation: Able to fully participate in evaluation  Level of consciousness: awake  Pain management: adequate  Airway patency: patent  Cardiovascular status: acceptable  Respiratory status: acceptable  Hydration status: acceptable  PONV: controlled     Anesthetic complications: None          Last vitals:  Vitals:    05/02/18 1415 05/02/18 1426 05/02/18 1442   BP: 115/74 123/76 135/76   Pulse:  58 60   Resp: 12  16   Temp: 36.2  C (97.2  F)     SpO2: 96% 95% 96%         Electronically Signed By: Flaquito Araujo MD, MD  May 2, 2018  2:49 PM

## 2018-05-02 NOTE — IP AVS SNAPSHOT
[facility]    [address] [place] [address]    Phone:  [phone]    Fax:  [phone]                                       After Visit Summary   5/2/2018    Alcides Velazquez    MRN: [id number]           After Visit Summary Signature Page     I have received my discharge instructions, and my questions have been answered. I have discussed any challenges I see with this plan with the nurse or doctor.    ..........................................................................................................................................  Patient/Patient Representative Signature      ..........................................................................................................................................  Patient Representative Print Name and Relationship to Patient    ..................................................               ................................................  Date                                            Time    ..........................................................................................................................................  Reviewed by Signature/Title    ...................................................              ..............................................  Date                                                            Time

## 2018-05-02 NOTE — IP AVS SNAPSHOT
MRN:[id number]                      After Visit Summary   5/2/2018    Alcides Velazquez    MRN: [id number]           Thank you!     Thank you for choosing [facility] for your care. Our goal is always to provide you with excellent care. Hearing back from our patients is one way we can continue to improve our services. Please take a few minutes to complete the written survey that you may receive in the mail after you visit with us. Thank you!        Patient Information     Date Of Birth          1980        About your hospital stay     You were admitted on:  May 2, 2018 [facility]    You were discharged on:  May 2, 2018       Who to Call     For medical emergencies, please call 911.  For non-urgent questions about your medical care, please call your primary care provider or clinic, [phone]  For questions related to your surgery, please call your surgery clinic        Attending Provider     Provider Specialty    Chito Cadet MD Urology       Primary Care Provider Office Phone # Fax #    Ismael Yan [phone]      After Care Instructions     Discharge Instructions       Activity  - No strenuous exercise for 3-5 days.  - No lifting, pushing, pulling more than 15 pounds for 3-5 days.   - Do not strain with bowel movements.  - Do not drive until you can press the brake pedal quickly and fully without pain.   - Do not operate a motor vehicle while taking narcotic pain medications.   - Some blood in the urine is expected. Increase your fluid intake to help flush the blood out of your bladder      Medications  - Stop flomax in a week and if all goes well--> may stop mirbetriq the week after.  - No narcotic pain medications are required and over the counter tylenol should suffice.  Wean yourself off all pain medications as you are able.  - Some pain medications contain both tylenol (acetaminophen) and a narcotic (Norco,  "vicodin, percocet), do not take more than 4,000mg of Tylenol (acetaminophen) from all sources in any 24 hour period.  - Take over the counter fiber (metamucil or benefiber) and stool softeners (miralax, docusate or senna) to prevent postoperative constipation, but stop if you develop diarrhea.  - No driving or operating machinery while taking narcotic pain medications     Follow-Up:  - Follow up with Lilliana Avalos in 3 months in the urology clinic. Otherwise you can just follow up when your symptoms return for your next botox injection  - Call your primary care provider to touch base regarding your recent admission.     - Call or return sooner than your regularly scheduled visit if you develop any of the following: fever (greater than 101.5), uncontrolled pain, uncontrolled nausea or vomiting, as well as worsening blood in the urine    Phone numbers:   - Monday through Friday 8am to 4:30pm: Call [phone] with questions or to schedule or confirm appointment.    - Nights or weekends: call the after hours emergency pager - [phone] and tell the  \"I would like to page the Urology Resident on call.\"  - For emergencies, call [phone]                  Further instructions from your care team       [facility]  Home Care Following Anesthesia  For 24 hours after surgery:  1. Get plenty of rest.  A responsible adult must stay with you for at least 24 hours after you leave the surgery center.  2. Do not drive or use heavy equipment.  If you have weakness or tingling, don't drive or use heavy equipment until this feeling goes away.   3. Do not drink alcohol.   4. Avoid strenuous or risky activities.  Ask for help when climbing stairs.  5. You may feel lightheaded.  IF so, sit for a few minutes before standing.  Have someone help you get up.   6. If you have nausea (feel sick to your stomach): Drink only clear liquids such as apple juice, ginger ale, broth or 7-Up.  Rest may also " help.  Be sure to drink enough fluids.  Move to a regular diet as you feel able.   7. You may have a slight fever.  Call the doctor if your fever is over 100 F (37.7 C) (taken under the tongue) or lasts longer than 24 hours.  8. You may have a dry mouth, a sore throat, muscle aches or trouble sleeping. These should go away after 24 hours.  9. Do not make important or legal decisions.               Tips for taking pain medications  To get the best pain relief possible, remember these points:    Take pain medications as directed, before pain becomes severe.    Pain medication can upset your stomach: taking it with food may help.    Constipation is a common side effect of pain medication. Drink plenty of  fluids.    Eat foods high in fiber. Take a stool softener if recommended by your doctor or pharmacist.    Do not drink alcohol, drive or operate machinery while taking pain medications.    Ask about other ways to control pain, such as with heat, ice or relaxation.    Tylenol/Acetaminophen Consumption  To help encourage the safe use of acetaminophen, the makers of TYLENOL  have lowered the maximum daily dose for single-ingredient Extra Strength TYLENOL  (acetaminophen) products sold in the [place] from 8 pills per day (4,000 mg) to 6 pills per day (3,000 mg). The dosing interval has also changed from 2 pills every 4-6 hours to 2 pills every 6 hours.    If you feel your pain relief is insufficient, you may take Tylenol/Acetaminophen in addition to your narcotic pain medication.     Be careful not to exceed 3,000 mg of Tylenol/Acetaminophen in a 24 hour period from all sources.    If you are taking extra strength Tylenol/acetaminophen (500 mg), the maximum dose is 6 tablets in 24 hours.    If you are taking regular strength acetaminophen (325 mg), the maximum dose is 9 tablets in 24 hours.    Call a doctor for any of the followin. Signs of infection (fever, growing tenderness at the surgery site, a large amount of  "drainage or bleeding, severe pain, foul-smelling drainage, redness, swelling).  2. It has been over 8 to 10 hours since surgery and you are still not able to urinate (pass water).  3. Headache for over 24 hours.  Your doctor is:  Dr. Chito Anderson, Prostate and Urology: [phone]                  Or dial [phone] and ask for the resident on call for:  Prostate Urology  For emergency care, call the:  [facility]:  [phone] (TTY for hearing impaired: [phone])                Pending Results     No orders found from 2018 to 5/3/2018.            Admission Information     Date & Time Provider Department Dept. Phone    2018 Chito Cadet MD [facility] [phone]      Your Vitals Were     Blood Pressure Pulse Temperature Respirations Height Weight    115/74 68 97.2  F (36.2  C) (Temporal) 12 1.803 m (5' 11\") 114.8 kg (253 lb)    Pulse Oximetry BMI (Body Mass Index)                96% 35.29 kg/m2          MyChart Information     [employer] is an electronic gateway that provides easy, online access to your medical records. With [employer], you can request a clinic appointment, read your test results, renew a prescription or communicate with your care team.     To sign up for [employer] visit the website at [email or web address]   You will be asked to enter the access code listed below, as well as some personal information. Please follow the directions to create your username and password.     Your access code is: [email or web address]  Expires: 2018  2:15 PM     Your access code will  in 90 days. If you need help or a new code, please contact your [facility] or call [phone] for assistance.        Care EveryWhere ID     This is your Care EveryWhere ID. This could be used by other organizations to access your [place] medical records  [id number]        Equal Access to Services     KRYSTAL GOULD AH: Hadii aad ku " tatayna Keene, waaxda luqadaha, qaybta kaalmada adeansonda, selwyn charlesin hayaan svenanna dorislolly laLenincatracho melissa. So [facility] [phone].    ATENCIÓN: Si rayola tate, tiene a cortes disposición servicios gratuitos de asistencia lingüística. Zeke al [phone].    We comply with applicable federal civil rights laws and [place] laws. We do not discriminate on the basis of race, color, national origin, age, disability, sex, sexual orientation, or gender identity.               Review of your medicines      START taking        Dose / Directions    acetaminophen 325 MG tablet   Commonly known as:  TYLENOL   Used for:  Overactive bladder        Dose:  325-650 mg   Take 1-2 tablets (325-650 mg) by mouth every 6 hours as needed for other (mild pain)   Quantity:  25 tablet   Refills:  0         CONTINUE these medicines which have NOT CHANGED        Dose / Directions    * ALPRAZolam 0.5 MG tablet   Commonly known as:  XANAX        Dose:  0.5 mg   Take 0.5 mg by mouth daily as needed   Refills:  0       * ALPRAZolam 0.5 MG tablet   Commonly known as:  XANAX        Dose:  0.5 mg   Take 0.5 mg by mouth   Refills:  0       FLUoxetine 40 MG capsule   Commonly known as:  PROzac        Dose:  40 mg   Take 40 mg by mouth daily   Refills:  0       melatonin 5 MG Caps        Dose:  5 mg   Take 5 mg by mouth daily   Refills:  0       mirabegron 25 MG 24 hr tablet   Commonly known as:  MYRBETRIQ   Used for:  Overactive bladder        Dose:  25 mg   Take 1 tablet (25 mg) by mouth daily   Quantity:  30 tablet   Refills:  3       naproxen sodium 220 MG tablet   Commonly known as:  ANAPROX        Dose:  220 mg   Take 220 mg by mouth daily   Refills:  0       NIFEdipine ER osmotic 30 MG 24 hr tablet   Commonly known as:  PROCARDIA XL        Dose:  30 mg   Take 30 mg by mouth daily   Refills:  0       polyethylene glycol powder   Commonly known as:  MIRALAX/GLYCOLAX        MIX 17 GRAMS (1 CAPFUL) IN LIQUID AND DRINK TWICE DAILY   Refills:  0        tamsulosin 0.4 MG capsule   Commonly known as:  FLOMAX        Dose:  0.4 mg   Take 0.4 mg by mouth daily   Refills:  0       * traMADol 50 MG tablet   Commonly known as:  ULTRAM        Dose:   mg   Take  mg by mouth nightly as needed   Refills:  0       * traMADol 50 MG tablet   Commonly known as:  ULTRAM        TAKE ONE TO TWO TABLETS BY MOUTH AT BEDTIME AS NEEDED FOR PAIN *CAUTION: OPIOID. RISK OF OVERDOSE AND ADDICTION.   Refills:  0       * Notice:  This list has 4 medication(s) that are the same as other medications prescribed for you. Read the directions carefully, and ask your doctor or other care provider to review them with you.         Where to get your medicines      These medications were sent to [address] [place] [address] [address] [place] [address]    Hours:  TRANSPLANT PHONE NUMBER [phone] Phone:  [phone]     acetaminophen 325 MG tablet                Protect others around you: Learn how to safely use, store and throw away your medicines at www.disposemymeds.org.             Medication List: This is a list of all your medications and when to take them. Check marks below indicate your daily home schedule. Keep this list as a reference.      Medications           Morning Afternoon Evening Bedtime As Needed    acetaminophen 325 MG tablet   Commonly known as:  TYLENOL   Take 1-2 tablets (325-650 mg) by mouth every 6 hours as needed for other (mild pain)   Last time this was given:  975 mg on 5/2/2018 12:14 PM                                * ALPRAZolam 0.5 MG tablet   Commonly known as:  XANAX   Take 0.5 mg by mouth daily as needed                                * ALPRAZolam 0.5 MG tablet   Commonly known as:  XANAX   Take 0.5 mg by mouth                                FLUoxetine 40 MG capsule   Commonly known as:  PROzac   Take 40 mg by mouth daily                                melatonin 5 MG Caps   Take  5 mg by mouth daily                                mirabegron 25 MG 24 hr tablet   Commonly known as:  MYRBETRIQ   Take 1 tablet (25 mg) by mouth daily                                naproxen sodium 220 MG tablet   Commonly known as:  ANAPROX   Take 220 mg by mouth daily                                NIFEdipine ER osmotic 30 MG 24 hr tablet   Commonly known as:  PROCARDIA XL   Take 30 mg by mouth daily                                polyethylene glycol powder   Commonly known as:  MIRALAX/GLYCOLAX   MIX 17 GRAMS (1 CAPFUL) IN LIQUID AND DRINK TWICE DAILY                                tamsulosin 0.4 MG capsule   Commonly known as:  FLOMAX   Take 0.4 mg by mouth daily                                * traMADol 50 MG tablet   Commonly known as:  ULTRAM   Take  mg by mouth nightly as needed                                * traMADol 50 MG tablet   Commonly known as:  ULTRAM   TAKE ONE TO TWO TABLETS BY MOUTH AT BEDTIME AS NEEDED FOR PAIN *CAUTION: OPIOID. RISK OF OVERDOSE AND ADDICTION.                                * Notice:  This list has 4 medication(s) that are the same as other medications prescribed for you. Read the directions carefully, and ask your doctor or other care provider to review them with you.

## 2018-05-02 NOTE — DISCHARGE INSTRUCTIONS
[facility]  Home Care Following Anesthesia  For 24 hours after surgery:  1. Get plenty of rest.  A responsible adult must stay with you for at least 24 hours after you leave the surgery center.  2. Do not drive or use heavy equipment.  If you have weakness or tingling, don't drive or use heavy equipment until this feeling goes away.   3. Do not drink alcohol.   4. Avoid strenuous or risky activities.  Ask for help when climbing stairs.  5. You may feel lightheaded.  IF so, sit for a few minutes before standing.  Have someone help you get up.   6. If you have nausea (feel sick to your stomach): Drink only clear liquids such as apple juice, ginger ale, broth or 7-Up.  Rest may also help.  Be sure to drink enough fluids.  Move to a regular diet as you feel able.   7. You may have a slight fever.  Call the doctor if your fever is over 100 F (37.7 C) (taken under the tongue) or lasts longer than 24 hours.  8. You may have a dry mouth, a sore throat, muscle aches or trouble sleeping. These should go away after 24 hours.  9. Do not make important or legal decisions.               Tips for taking pain medications  To get the best pain relief possible, remember these points:    Take pain medications as directed, before pain becomes severe.    Pain medication can upset your stomach: taking it with food may help.    Constipation is a common side effect of pain medication. Drink plenty of  fluids.    Eat foods high in fiber. Take a stool softener if recommended by your doctor or pharmacist.    Do not drink alcohol, drive or operate machinery while taking pain medications.    Ask about other ways to control pain, such as with heat, ice or relaxation.    Tylenol/Acetaminophen Consumption  To help encourage the safe use of acetaminophen, the makers of TYLENOL  have lowered the maximum daily dose for single-ingredient Extra Strength TYLENOL  (acetaminophen) products sold in the [place] from 8  pills per day (4,000 mg) to 6 pills per day (3,000 mg). The dosing interval has also changed from 2 pills every 4-6 hours to 2 pills every 6 hours.    If you feel your pain relief is insufficient, you may take Tylenol/Acetaminophen in addition to your narcotic pain medication.     Be careful not to exceed 3,000 mg of Tylenol/Acetaminophen in a 24 hour period from all sources.    If you are taking extra strength Tylenol/acetaminophen (500 mg), the maximum dose is 6 tablets in 24 hours.    If you are taking regular strength acetaminophen (325 mg), the maximum dose is 9 tablets in 24 hours.    Call a doctor for any of the followin. Signs of infection (fever, growing tenderness at the surgery site, a large amount of drainage or bleeding, severe pain, foul-smelling drainage, redness, swelling).  2. It has been over 8 to 10 hours since surgery and you are still not able to urinate (pass water).  3. Headache for over 24 hours.  Your doctor is:  Dr. Chito Anderson, Prostate and Urology: [phone]                  Or dial [phone] and ask for the resident on call for:  Prostate Urology  For emergency care, call the:  [facility]:  [phone] (TTY for hearing impaired: [phone])

## 2018-05-02 NOTE — ANESTHESIA CARE TRANSFER NOTE
Patient: Alcides Velazquez    Procedure(s):  Cystoscopy and Botox Injection into the Bladder  **Latex Allergy** - Wound Class: I-Clean   - Wound Class: II-Clean Contaminated    Diagnosis: Neurogenic Bladder  Diagnosis Additional Information: No value filed.    Anesthesia Type:   General, LMA     Note:  Airway :Room Air  Patient transferred to:PACU  Comments: Arrive PACU, Stable, Airway Intact  112/67, 79,16,93,97.4  All questions answered.      Vitals: (Last set prior to Anesthesia Care Transfer)    CRNA VITALS  5/2/2018 1327 - 5/2/2018 1403      5/2/2018             EKG: NSR                Electronically Signed By: EDDI Sams CRNA  May 2, 2018  2:03 PM

## 2018-05-03 ENCOUNTER — TELEPHONE (OUTPATIENT)
Dept: UROLOGY | Facility: CLINIC | Age: 38
End: 2018-05-03

## 2018-05-03 NOTE — TELEPHONE ENCOUNTER
----- Message from Stella Fuller RN sent at 5/2/2018  2:14 PM CDT -----  Regarding: Follow up with Lilliana in 3 months  Zach,    Please schedule a 3 month follow up with Lilliana to discuss if botox is working    Stella Millard  ----- Message -----     From: Javier Felder MD     Sent: 5/2/2018   2:09 PM       To: JAYDEN Banda Dr wants this pt to f/u with Lilliana Avalos in 3 months in the urology clinic.     Thanks!  Javier Felder MD  Urology Resident

## 2018-05-03 NOTE — TELEPHONE ENCOUNTER
Left a message to assist in scheduling 3 month f/u on botox with Lilliana Avalos per Dr. Anderson.

## 2018-05-04 ENCOUNTER — CARE COORDINATION (OUTPATIENT)
Dept: UROLOGY | Facility: CLINIC | Age: 38
End: 2018-05-04

## 2018-05-04 NOTE — PROGRESS NOTES
Urology Postop Phone Note:    Mr. Alcides Velazquez is a 37 year old male who underwent Cystoscopy and Botox injection into the bladder on 5/2/18 with Dr. Cadet for a history of neurogenic bladder.  His postoperative course was unremarkable and the patient was d/c to home on 5/2/18.    Fevers/chills: Patient denies any fever or chills.  Eating/drinking: Patient is able to eat and drink without any complaints.  Bowel habits: Patient reports having a normal bowel movement.  Urine output via urethra Color Yellow with a little pink Clarity Clear Odor None  Pain: Patient reports pain as a 0 out of 10.    Narcotics: The patient denies taking any pain medication.  Antibiotics: No    Patient scheduled for follow up on 8/6/18 at 1000 with Dr. Cadet.    Informed the patient of the clinic triage nursing # ([phone] option 3) and urology resident on call # for after hours concerns.    Stella Fuller RN  Care Coordinator - Reconstructive Urology

## 2018-05-25 NOTE — OP NOTE
PRE-OPERATIVE DIAGNOSIS:   1.  Neurogenic bladder    POST-OPERATIVE DIAGNOSIS:  1.  Neurogenic bladder    PROCEDURE:    1. Cystourethroscopy.   2. injection of botulinum toxin A 200units in 20cc sterile saline      SURGEON:  Chito Cadet MD    RESIDENT:  none  ANESTHESIA:  MAC    ESTIMATED BLOOD LOSS:  5 mL    DRAINS:  None    SIGNIFICANT FINDINGS: none    OPERATIVE INDICATIONS:  Alcides Velazquez is a 37 year old male with neurogenic bladder. He elects to proceed with botulinum toxin injection understanding the risks for urinary retention, infection, pain, bleeding, need for future procedures and risks of anesthesia.     OPERATIVE DETAILS:  The patient was taken to the operating room in his usual state of health.   He was positioned in modified dorsal lithotomy with yellowfin stirrups.  His genitalia were prepped and draped in the standard fashion. A time-out was performed to ensure proper patient, procedure and positioning.  The patient received appropriate IV antibiotics prior to the procedure.    A 21-Setswana Melchor injection cystoscope was placed into the bladder.  The bladder mucosa appeared to be normal without stones, tumors or diverticulum on 360 degree inspection. The ureteral orifices were in the normal orthotopic position bilaterally.  We mixed 2 vials of 100 U botulinum toxin A into 20 mL of injectable saline for a total of (10 units/mL).  We performed a template injection procedure with 5 columns of 4 injections. All injections were placed deep to the mucosa into the detrusor muscle.  We then emptied her bladder to re-inspect our injection sites and found that there was a minimal amount of blood. Her bladder was then emptied again. The patient was returned to supine position and transported to recovery in stable condition.      PLAN: repeat in 6 months

## 2018-08-03 ENCOUNTER — PRE VISIT (OUTPATIENT)
Dept: UROLOGY | Facility: CLINIC | Age: 38
End: 2018-08-03

## 2018-08-03 NOTE — TELEPHONE ENCOUNTER
Reason for visit: 3 month botox symptom check     Relevant information: Neurogenic bladder    Records/imaging/labs: all records available    Pt called: no need for a call    Rooming: regular

## 2018-08-06 ENCOUNTER — OFFICE VISIT (OUTPATIENT)
Dept: UROLOGY | Facility: CLINIC | Age: 38
End: 2018-08-06
Payer: COMMERCIAL

## 2018-08-06 VITALS
WEIGHT: 247.6 LBS | SYSTOLIC BLOOD PRESSURE: 139 MMHG | DIASTOLIC BLOOD PRESSURE: 81 MMHG | HEIGHT: 71 IN | OXYGEN SATURATION: 98 % | HEART RATE: 80 BPM | BODY MASS INDEX: 34.66 KG/M2

## 2018-08-06 DIAGNOSIS — N31.9 NEUROGENIC BLADDER: Primary | ICD-10-CM

## 2018-08-06 ASSESSMENT — PAIN SCALES - GENERAL: PAINLEVEL: NO PAIN (0)

## 2018-08-06 NOTE — LETTER
8/6/2018       RE: Alcides Velazquez  [address]  [place] [address]     Dear Colleague,    Thank you for referring your patient, Alcides Velazquez, to the [facility] UROLOGY AND INST FOR PROSTATE AND UROLOGIC CANCERS at [facility]. Please see a copy of my visit note below.    NEUROGENIC BLADDER FOLLOW UP   Name: Alcides Velazquez    MRN: [id number]   YOB: 1980                 Chief Complaint:   Neurogenic Bladder          History of Present Illness:   HISTORY: Alcides Velazquez is a 38 year old male with a history of neurogenic bladder secondary to guillain-barre syndrome (diagnosed 01/2016) who presents for follow-up for ongoing symptoms of urinary frequency and urgency which have not resolved following a bladder botox treatment (5/2/18)  After initial guillain-barre diagnosis, his voiding dysfunction was initially treated with medical management and bladder botox, and did not resolve. He was subsequently found to have a bulbar urethral stricture (managed by internal urethrotomy 08/2017) and bladder neck obstruction (managed by transurethral incision of bladder neck 10/2017). He is not wheelchair-bound.     His primary symptoms today are urinary urgency and frequency, consistent with his primary symptoms in the past. He complains less of a slow urinary stream. He is not experiencing any leaking. He believes that his symptoms have gotten somewhat worse following his most recent botox treatment. Following botox treatment, he has stopped taking his myrbetric because he did not find it helpful. He is continuing to take Flomax, which is helpful.     Previous Bladder Surgeries:  Previous Bladder Augmentation: None  Catheterizable stoma: None   Anti-incontinence procedures: None  Botox injections:   5/2/2018: injection of botulinum toxin A 200 units in 20cc sterile saline   4/14/2017: injection of botulinum toxin A 100 units     Current Bladder Medications:  Anticholinergics:NO   M-3  agonist (e.g., mirabegron): Prescribed, not taking mirabegron 25mg   TCAs (e.g. Imipramine): NO   Alpha blockers: YES tamsulosin 0.4mg   Prophylactic antibiotics: NO   Intravesical gentamycin: NO   Intravesical oxybutinin: NO  Cranberry or D-mannose or other neutraceuticals for UTI preventions: NO   Other: NONE     Current Bladder Management:  He does not irrigate the bladder.     Incontinence History:  He does have urge urinary incontinence.  He does have stress urinary incontinence.    Urinary Tract Infection History:  Treated with antibiotics for positive culture and non-specific symptoms: 0  Treated with antibiotics for positive culture plus symptoms of UTI: 0    Bowel Movement History:  The patient has a bowel movement q 1-2 days. Bowel regimen includes miralax twice daily.            Past Medical History:     Past Medical History:   Diagnosis Date     Anxiety      Left bundle branch block      PONV (postoperative nausea and vomiting)             Past Surgical History:     Past Surgical History:   Procedure Laterality Date     CYSTOSCOPY      presures were high     CYSTOSCOPY N/A 5/2/2018    Procedure: CYSTOSCOPY;  Cystoscopy and Botox Injection into the Bladder  **Latex Allergy**;  Surgeon: Chito Cadet MD;  [facility]     CYSTOSCOPY, BLADDER NECK CUTS, COMBINED N/A 10/27/2017    Procedure: COMBINED CYSTOSCOPY, BLADDER NECK CUTS;  Transurethral Incision of Bladder Neck;  Surgeon: Chito Cadet MD;  [facility]     CYSTOSCOPY, TRANSURETHRAL RESECTION (TUR) PROSTATE, COMBINED N/A 8/11/2017    Procedure: COMBINED CYSTOSCOPY, TRANSURETHRAL RESECTION (TUR) PROSTATE;  Cystoscopy, Direct Visual Interal Uretherotomy;  Surgeon: Chito Cadet MD;  [facility]     INJECT BOTOX N/A 5/2/2018    Procedure: INJECT BOTOX;;  Surgeon: Chito Cadet MD;  [facility]            Social History:     Social History   Substance Use Topics     Smoking status: Never Smoker      "Smokeless tobacco: Former User     Types: Chew     Alcohol use Yes      Comment: Social     Ethnicity: white       Family History:     Family History   Problem Relation Age of Onset     Diabetes Father      Hypertension No family hx of               Allergies:     Allergies   Allergen Reactions     Influenza Vaccines      Other reaction(s): Other, see comments  Possibly caused [place]            Medications:     Current Outpatient Prescriptions   Medication Sig     acetaminophen (TYLENOL) 325 MG tablet Take 1-2 tablets (325-650 mg) by mouth every 6 hours as needed for other (mild pain)     ALPRAZolam (XANAX) 0.5 MG tablet Take 0.5 mg by mouth     ALPRAZolam (XANAX) 0.5 MG tablet Take 0.5 mg by mouth daily as needed     FLUoxetine (PROZAC) 40 MG capsule Take 40 mg by mouth daily     naproxen sodium (ANAPROX) 220 MG tablet Take 220 mg by mouth daily     NIFEdipine ER osmotic (PROCARDIA XL) 30 MG 24 hr tablet Take 30 mg by mouth daily     polyethylene glycol (MIRALAX/GLYCOLAX) powder MIX 17 GRAMS (1 CAPFUL) IN LIQUID AND DRINK TWICE DAILY     tamsulosin (FLOMAX) 0.4 MG capsule Take 0.4 mg by mouth daily     traMADol (ULTRAM) 50 MG tablet TAKE ONE TO TWO TABLETS BY MOUTH AT BEDTIME AS NEEDED FOR PAIN *CAUTION: OPIOID. RISK OF OVERDOSE AND ADDICTION.     traMADol (ULTRAM) 50 MG tablet Take  mg by mouth nightly as needed     melatonin 5 MG CAPS Take 5 mg by mouth daily     mirabegron (MYRBETRIQ) 25 MG 24 hr tablet Take 1 tablet (25 mg) by mouth daily (Patient not taking: Reported on 8/6/2018)     No current facility-administered medications for this visit.              Review of Systems:    ROS: 14 point ROS neg other than the symptoms noted above in the HPI and PMH.          Physical Exam:   /81 (BP Location: Right arm, Patient Position: Sitting, Cuff Size: Adult Large)  Pulse 80  Ht 1.803 m (5' 11\")  Wt 112.3 kg (247 lb 9.6 oz)  SpO2 98%  BMI 34.53 kg/m2    General: age-appropriate appearing male " in NAD sitting in exam chair       HEENT: Head AT/NC, CN Grossly intact.  Resp: no respiratory distress  CV: Not performed   Lymph: Not performed   Back: Not performed   Abdomen: Not performed   : Not performed   Rectal exam: Not performed   LE: Not performed   Neuro: Not performed   Motor: Not performed   Skin: Not performed             Assessment and Plan:   38 year old male with neurogenic bladder secondary to guillain-barre syndrome who is continuing to experience urinary urgency and frequency following bladder botox treatment in May. Now, symptoms could be due to over-relaxation of detrusor following botox treatment. Plan to use self-catheterization to monitor post-void residual volumes. He will void approximately six times per day, self-cath afterward, and keep a journal of residual volumes. If volumes decrease to approximately less than 100, he will decrease the number of times he self-caths throughout the day to five. He will continue to decrease the number of times he self-caths as post-void residual volumes continue to decrease. Continue to use flomax as it has been helpful.     Plan to return in 3 months for urodynamics. Will return sooner if post-void residual volumes are not decreasing before that time.       Additional work-up planned:   Orders Placed This Encounter   Procedures     COMPLEX UROFLOWMETRY     Bladder scan         Bertha Mayer  August 6, 2018     As the medical student, I acted as a scribe for this note. All portions of the documented history and physical were personally performed by Chito Cadet MD as the attending physician.       The medical student acted as a scribe for this note. All portions of the documented history and physical were personally performed by me as the attending physician.         Again, thank you for allowing me to participate in the care of your patient.      Sincerely,    Chito Cdaet MD

## 2018-08-06 NOTE — PROGRESS NOTES
NEUROGENIC BLADDER FOLLOW UP   Name: Alcides Velazquez    MRN: [id number]   YOB: 1980                 Chief Complaint:   Neurogenic Bladder          History of Present Illness:   HISTORY: Alcides Velazquez is a 38 year old male with a history of neurogenic bladder secondary to guillain-barre syndrome (diagnosed 01/2016) who presents for follow-up for ongoing symptoms of urinary frequency and urgency which have not resolved following a bladder botox treatment (5/2/18)  After initial guillain-barre diagnosis, his voiding dysfunction was initially treated with medical management and bladder botox, and did not resolve. He was subsequently found to have a bulbar urethral stricture (managed by internal urethrotomy 08/2017) and bladder neck obstruction (managed by transurethral incision of bladder neck 10/2017). He is not wheelchair-bound.     His primary symptoms today are urinary urgency and frequency, consistent with his primary symptoms in the past. He complains less of a slow urinary stream. He is not experiencing any leaking. He believes that his symptoms have gotten somewhat worse following his most recent botox treatment. Following botox treatment, he has stopped taking his myrbetric because he did not find it helpful. He is continuing to take Flomax, which is helpful.     Previous Bladder Surgeries:  Previous Bladder Augmentation: None  Catheterizable stoma: None   Anti-incontinence procedures: None  Botox injections:   5/2/2018: injection of botulinum toxin A 200 units in 20cc sterile saline   4/14/2017: injection of botulinum toxin A 100 units     Current Bladder Medications:  Anticholinergics:NO   M-3 agonist (e.g., mirabegron): Prescribed, not taking mirabegron 25mg   TCAs (e.g. Imipramine): NO   Alpha blockers: YES tamsulosin 0.4mg   Prophylactic antibiotics: NO   Intravesical gentamycin: NO   Intravesical oxybutinin: NO  Cranberry or D-mannose or other neutraceuticals for UTI preventions: NO   Other:  NONE     Current Bladder Management:  He does not irrigate the bladder.     Incontinence History:  He does have urge urinary incontinence.  He does have stress urinary incontinence.    Urinary Tract Infection History:  Treated with antibiotics for positive culture and non-specific symptoms: 0  Treated with antibiotics for positive culture plus symptoms of UTI: 0    Bowel Movement History:  The patient has a bowel movement q 1-2 days. Bowel regimen includes miralax twice daily.            Past Medical History:     Past Medical History:   Diagnosis Date     Anxiety      Left bundle branch block      PONV (postoperative nausea and vomiting)             Past Surgical History:     Past Surgical History:   Procedure Laterality Date     CYSTOSCOPY      presures were high     CYSTOSCOPY N/A 5/2/2018    Procedure: CYSTOSCOPY;  Cystoscopy and Botox Injection into the Bladder  **Latex Allergy**;  Surgeon: Chito Cadet MD;  [facility]     CYSTOSCOPY, BLADDER NECK CUTS, COMBINED N/A 10/27/2017    Procedure: COMBINED CYSTOSCOPY, BLADDER NECK CUTS;  Transurethral Incision of Bladder Neck;  Surgeon: Chito Cadet MD;  [facility]     CYSTOSCOPY, TRANSURETHRAL RESECTION (TUR) PROSTATE, COMBINED N/A 8/11/2017    Procedure: COMBINED CYSTOSCOPY, TRANSURETHRAL RESECTION (TUR) PROSTATE;  Cystoscopy, Direct Visual Interal Uretherotomy;  Surgeon: Chito Cadet MD;  [facility]     INJECT BOTOX N/A 5/2/2018    Procedure: INJECT BOTOX;;  Surgeon: Chito Cadet MD;  [facility]            Social History:     Social History   Substance Use Topics     Smoking status: Never Smoker     Smokeless tobacco: Former User     Types: Chew     Alcohol use Yes      Comment: Social     Ethnicity: white       Family History:     Family History   Problem Relation Age of Onset     Diabetes Father      Hypertension No family hx of               Allergies:     Allergies   Allergen Reactions     Influenza  "Vaccines      Other reaction(s): Other, see comments  Possibly caused Guillian [place]            Medications:     Current Outpatient Prescriptions   Medication Sig     acetaminophen (TYLENOL) 325 MG tablet Take 1-2 tablets (325-650 mg) by mouth every 6 hours as needed for other (mild pain)     ALPRAZolam (XANAX) 0.5 MG tablet Take 0.5 mg by mouth     ALPRAZolam (XANAX) 0.5 MG tablet Take 0.5 mg by mouth daily as needed     FLUoxetine (PROZAC) 40 MG capsule Take 40 mg by mouth daily     naproxen sodium (ANAPROX) 220 MG tablet Take 220 mg by mouth daily     NIFEdipine ER osmotic (PROCARDIA XL) 30 MG 24 hr tablet Take 30 mg by mouth daily     polyethylene glycol (MIRALAX/GLYCOLAX) powder MIX 17 GRAMS (1 CAPFUL) IN LIQUID AND DRINK TWICE DAILY     tamsulosin (FLOMAX) 0.4 MG capsule Take 0.4 mg by mouth daily     traMADol (ULTRAM) 50 MG tablet TAKE ONE TO TWO TABLETS BY MOUTH AT BEDTIME AS NEEDED FOR PAIN *CAUTION: OPIOID. RISK OF OVERDOSE AND ADDICTION.     traMADol (ULTRAM) 50 MG tablet Take  mg by mouth nightly as needed     melatonin 5 MG CAPS Take 5 mg by mouth daily     mirabegron (MYRBETRIQ) 25 MG 24 hr tablet Take 1 tablet (25 mg) by mouth daily (Patient not taking: Reported on 8/6/2018)     No current facility-administered medications for this visit.              Review of Systems:    ROS: 14 point ROS neg other than the symptoms noted above in the HPI and PMH.          Physical Exam:   /81 (BP Location: Right arm, Patient Position: Sitting, Cuff Size: Adult Large)  Pulse 80  Ht 1.803 m (5' 11\")  Wt 112.3 kg (247 lb 9.6 oz)  SpO2 98%  BMI 34.53 kg/m2    General: age-appropriate appearing male in NAD sitting in exam chair       HEENT: Head AT/NC, CN Grossly intact.  Resp: no respiratory distress  CV: Not performed   Lymph: Not performed   Back: Not performed   Abdomen: Not performed   : Not performed   Rectal exam: Not performed   LE: Not performed   Neuro: Not performed   Motor: Not performed "   Skin: Not performed             Assessment and Plan:   38 year old male with neurogenic bladder secondary to guillain-barre syndrome who is continuing to experience urinary urgency and frequency following bladder botox treatment in May. Now, symptoms could be due to over-relaxation of detrusor following botox treatment. Plan to use self-catheterization to monitor post-void residual volumes. He will void approximately six times per day, self-cath afterward, and keep a journal of residual volumes. If volumes decrease to approximately less than 100, he will decrease the number of times he self-caths throughout the day to five. He will continue to decrease the number of times he self-caths as post-void residual volumes continue to decrease. Continue to use flomax as it has been helpful.     Plan to return in 3 months for urodynamics. Will return sooner if post-void residual volumes are not decreasing before that time.       Additional work-up planned:   Orders Placed This Encounter   Procedures     COMPLEX UROFLOWMETRY     Bladder scan         Bertha Mayer  August 6, 2018     As the medical student, I acted as a scribe for this note. All portions of the documented history and physical were personally performed by Chito Cadet MD as the attending physician.       The medical student acted as a scribe for this note. All portions of the documented history and physical were personally performed by me as the attending physician.

## 2018-08-06 NOTE — PROGRESS NOTES
[facility]    Once completed please fax to [phone] or [phone]    A. Please include patient demographics and chart notes    Name: Alcides Velazquez   : 1980   Phone: [phone]  Address: [address] [place] [address]    B. Diagnosis    X Retention of urine (788.20/R33.9)   Urinary Incontinence (788.30/R30)    Incomplete Bladder Emptying (788.21/R39-14)   Urge Incontinence (788.31/N39.41)    Other Specified Retention of Urine (788.29/R33.8)   Other:     C. Order Information    Number of Refills: 99  Length of Need: 99 months    X Patient may receive up to a 90-day supply at one time; therefore, quantity will be three (3) times amount below.    Type (check one): X Hydrophilic    Silicone    Red Rubber    PVC Clear                                  X Anti-Bacterial / Infection Control    CHECK PRODUCT Serbian FREQ OF USE QUANTITY PER STRAIGHT COUDE   ONE  SIZE PER DAY MONTH TO DISPESE     X Intermittent Catheter 14 6 180 X     Intermittent Catheter with         Insertion Supplies         Condom or External Catheters          ADDITIONAL PRODUCTS/ITEMS ORDERED (check all that apply)     PRODUCT FREQ OF USE QUANTITY PER  _X_ Patient Choice     PER MONTH MONTH TO DISPENSE  ___ Bard   X Tube of Lubricant 2 2  ___ Coloplast    Leg Bags    ___ Cure Medical    Night Bags    ___ GentleCath    Penile Clamp (Cunningham)    ___ Hi-Slip    Disinfection/BZK-PDI Wipes    ___ Bert    Lares Insertion Tray    ___Lo Fric    Vacuum Erection Device    ___Magic3    Lares Catheters:    ___ MTG    Straight    ___ Eric-Teleflex    Coude    ___ SpediCath    Icelandic size        Balloon size         D. Physician's Information:    Physician's Name: Dr. Chito Cadet  Phone: [phone]   Date: 18     [facility]  [place] [facility]  [address] [place] [address], [address]    Debbie Luciano Manager  Office:  [phone]  Mobile: [phone]  Fax: [phone]  [email or web address]

## 2018-08-06 NOTE — NURSING NOTE
Following clinic protocol I instructed Alcides FROYLAN Famclau in [facility]. Alcides was able to demonstrate good hand hygiene and genital hygiene. He was able to self catheterize without difficulty using a 14 fr straight tip catheter for urinary retention.    Pt instructed to catheterize after voiding 6 times a day. He can decrease to five times a day if his residuals are under 100 mL and 4 times a day if they are under 50 mL.

## 2018-08-06 NOTE — PATIENT INSTRUCTIONS
Self catheterize 6 times a day using a 14 Fr after urinating. If your post voids residuals are under 100 mL you can go down to 5 times a day.    Schedule Urodynamics.     Return for results.

## 2018-08-06 NOTE — MR AVS SNAPSHOT
"              After Visit Summary   8/6/2018    Alcides Velazquez    MRN: [id number]           Patient Information     Date Of Birth          1980        Visit Information        Provider Department      8/6/2018 10:00 AM Chito Cadet MD [facility] Urology and [facility] for Prostate and Urologic Cancers        Today's Diagnoses     Neurogenic bladder    -  1      Care Instructions    Self catheterize 6 times a day using a 14 Fr after urinating. If your post voids residuals are under 100 mL you can go down to 5 times a day.    Schedule Urodynamics.     Return for results.                Follow-ups after your visit        Who to contact     Please call your clinic at [phone] to:    Ask questions about your health    Make or cancel appointments    Discuss your medicines    Learn about your test results    Speak to your doctor            Additional Information About Your Visit        Care EveryWhere ID     This is your Care EveryWhere ID. This could be used by other organizations to access your [facility] medical records  [id number]        Your Vitals Were     Pulse Height Pulse Oximetry BMI (Body Mass Index)          80 1.803 m (5' 11\") 98% 34.53 kg/m2         Blood Pressure from Last 3 Encounters:   08/06/18 139/81   05/02/18 135/76   03/12/18 136/81    Weight from Last 3 Encounters:   08/06/18 112.3 kg (247 lb 9.6 oz)   05/02/18 114.8 kg (253 lb)   03/12/18 115.2 kg (253 lb 14.4 oz)              Today, you had the following     No orders found for display       Primary Care Provider Office Phone # Fax #    Ismael Yan [phone]       [facility] [address]  [facility] [address]        Equal Access to Services     [facility]: Hadii abhi hdz hadasho Sodavid, waaxda luqadaha, qaybta kaalmada selwyn bean. So [facility] [phone].    ATENCIÓN: Si habla español, tiene a cortes disposición servicios gratuitos de asistencia lingüística. Llame al " [phone].    We comply with applicable federal civil rights laws and [place] laws. We do not discriminate on the basis of race, color, national origin, age, disability, sex, sexual orientation, or gender identity.            Thank you!     Thank you for choosing [facility] UROLOGY AND [facility] FOR PROSTATE AND UROLOGIC CANCERS  for your care. Our goal is always to provide you with excellent care. Hearing back from our patients is one way we can continue to improve our services. Please take a few minutes to complete the written survey that you may receive in the mail after your visit with us. Thank you!             Your Updated Medication List - Protect others around you: Learn how to safely use, store and throw away your medicines at www.disposemymeds.org.          This list is accurate as of 8/6/18 10:52 AM.  Always use your most recent med list.                   Brand Name Dispense Instructions for use Diagnosis    acetaminophen 325 MG tablet    TYLENOL    25 tablet    Take 1-2 tablets (325-650 mg) by mouth every 6 hours as needed for other (mild pain)    Overactive bladder       * ALPRAZolam 0.5 MG tablet    XANAX     Take 0.5 mg by mouth daily as needed        * ALPRAZolam 0.5 MG tablet    XANAX     Take 0.5 mg by mouth        FLUoxetine 40 MG capsule    PROzac     Take 40 mg by mouth daily        melatonin 5 MG Caps      Take 5 mg by mouth daily        mirabegron 25 MG 24 hr tablet    MYRBETRIQ    30 tablet    Take 1 tablet (25 mg) by mouth daily    Overactive bladder       naproxen sodium 220 MG tablet    ANAPROX     Take 220 mg by mouth daily        NIFEdipine ER osmotic 30 MG 24 hr tablet    PROCARDIA XL     Take 30 mg by mouth daily        polyethylene glycol powder    MIRALAX/GLYCOLAX     MIX 17 GRAMS (1 CAPFUL) IN LIQUID AND DRINK TWICE DAILY        tamsulosin 0.4 MG capsule    FLOMAX     Take 0.4 mg by mouth daily        * traMADol 50 MG tablet    ULTRAM     Take  mg by mouth nightly as needed         * traMADol 50 MG tablet    ULTRAM     TAKE ONE TO TWO TABLETS BY MOUTH AT BEDTIME AS NEEDED FOR PAIN *CAUTION: OPIOID. RISK OF OVERDOSE AND ADDICTION.        * Notice:  This list has 4 medication(s) that are the same as other medications prescribed for you. Read the directions carefully, and ask your doctor or other care provider to review them with you.

## 2018-10-29 ENCOUNTER — PRE VISIT (OUTPATIENT)
Dept: UROLOGY | Facility: CLINIC | Age: 38
End: 2018-10-29

## 2018-10-29 NOTE — TELEPHONE ENCOUNTER
Reason for visit: review UDS     Relevant information: Neurogenic bladder     Records/imaging/labs: all appointments scheduled     Pt called: no need for a call     Rooming: regular

## 2018-10-30 ENCOUNTER — PRE VISIT (OUTPATIENT)
Dept: UROLOGY | Facility: CLINIC | Age: 38
End: 2018-10-30

## 2018-10-31 ENCOUNTER — OFFICE VISIT (OUTPATIENT)
Dept: UROLOGY | Facility: CLINIC | Age: 38
End: 2018-10-31
Payer: COMMERCIAL

## 2018-10-31 ENCOUNTER — RADIANT APPOINTMENT (OUTPATIENT)
Dept: RADIOLOGY | Facility: AMBULATORY SURGERY CENTER | Age: 38
End: 2018-10-31
Attending: PHYSICIAN ASSISTANT
Payer: COMMERCIAL

## 2018-10-31 VITALS — WEIGHT: 240 LBS | BODY MASS INDEX: 33.6 KG/M2 | HEIGHT: 71 IN

## 2018-10-31 DIAGNOSIS — N31.9 NEUROGENIC BLADDER: ICD-10-CM

## 2018-10-31 DIAGNOSIS — N31.9 NEUROGENIC BLADDER: Primary | ICD-10-CM

## 2018-10-31 PROBLEM — M79.672 FOOT PAIN, BILATERAL: Status: ACTIVE | Noted: 2018-10-08

## 2018-10-31 PROBLEM — M79.671 FOOT PAIN, BILATERAL: Status: ACTIVE | Noted: 2018-10-08

## 2018-10-31 PROBLEM — M25.511 ACUTE PAIN OF RIGHT SHOULDER: Status: ACTIVE | Noted: 2018-10-08

## 2018-10-31 LAB
APPEARANCE UR: CLEAR
BILIRUB UR QL: NORMAL
COLOR UR: YELLOW
GLUCOSE URINE: NORMAL MG/DL
HGB UR QL: NORMAL
KETONES UR QL: 5 MG/DL
LEUKOCYTE ESTERASE URINE: NORMAL
NITRITE UR QL STRIP: NORMAL
PH UR STRIP: 5.5 PH (ref 5–7)
PROTEIN ALBUMIN URINE: NORMAL MG/DL
SOURCE: NORMAL
SP GR UR STRIP: 1.03 (ref 1–1.03)
UROBILINOGEN UR QL STRIP: 0.2 EU/DL (ref 0.2–1)

## 2018-10-31 RX ORDER — DULOXETIN HYDROCHLORIDE 30 MG/1
CAPSULE, DELAYED RELEASE ORAL
COMMUNITY
Start: 2018-10-08

## 2018-10-31 ASSESSMENT — PAIN SCALES - GENERAL: PAINLEVEL: NO PAIN (0)

## 2018-10-31 NOTE — NURSING NOTE
Invasive Procedure Safety Checklist:    Procedure: Urodynamics Study    Action: Complete sections and checkboxes as appropriate.    Pre-procedure:  1. Patient ID Verified with 2 identifiers ([place] and  or MRN) : YES    2. Procedure and site verified with patient/designee (when able) : YES    3. Accurate consent documentation in medical record : YES    4. H&P (or appropriate assessment) documented in medical record : NO  H&P must be up to 30 days prior to procedure an updated within 24 hours of                 Procedure as applicable.     5. Relevant diagnostic and radiology test results appropriately labeled and displayed as applicable : NO    6. Blood products, implants, devices, and/or special equipment available for the procedure as applicable : NO    7. Procedure site(s) marked with provider initials [Exclusions: ] : NO    8. Marking not required. Reason : Yes  Procedure does not require site marking    Time Out:     Time-Out performed immediately prior to starting procedure, including verbal and active participation of all team members addressing: YES    1. Correct patient identity.  2. Confirmed that the correct side and site are marked.  3. An accurate procedure to be done.  4. Agreement on the procedure to be done.  5. Correct patient position.  6. Relevant images and results are properly labeled and appropriately displayed.  7. The need to administer antibiotics or fluids for irrigation purposes during the procedure as applicable.  8. Safety precautions based on patient history or medication use.    During Procedure: Verification of correct person, site, and procedure occurs any time the responsibility for care of the patient is transferred to another member of the care team.      The following medication was given:     MEDICATION: Ciprofloxacin  ROUTE: PO  SITE: Medication was given orally   DOSE: 500 mg   LOT #: [id number]  :  [employer]  EXPIRATION DATE:  2020  NDC#:  [id number]    Sharla Evans ZACHARY

## 2018-10-31 NOTE — LETTER
10/31/2018       RE: Alcides Velazquez  [address]  [place] [address]     Dear Colleague,    Thank you for referring your patient, Alcides Velazquez, to the [facility] UROLOGY AND INST FOR PROSTATE AND UROLOGIC CANCERS at [facility]. Please see a copy of my visit note below.    PREPROCEDURE DIAGNOSES:    1. 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 Urinary frequency/urgency, urge incontinence despite bladder Botox injections in May 2018.  3. Incomplete bladder emptying, performing CIC once qod    POSTPROCEDURE DIAGNOSES:  -Multiple episodes of detrusor overactvity without incontinence starting at bladder volumes >250 mL. Max Pdet reaches 43 cm H2O during filling.  -Fair bladder compliance - ratio 15.5 (PDet=17 cmH20 at capacity but this is artificially low due to elevated Pabd pressure at the end of filling. Pves at capacity is 64 cm H2O which is 47 cm H2O higher than starting Pves pressure of 17 cm H2O. Therefore, compliance ratio is 733 mL / 47 cm H2O = 15.5).  - mL with normal filling sensations.  -He does not leak to define DLPP. No HERMAN.  -Repeated strong detrusor contractions to >125 cm H2O during attempt to void in the UDS suite. However, he only voids a few drops.   -Bladder neck appears slightly open on fluoro during his attempt to void, but could not get great voiding images since he only voided a few drops.  -After attempting to void for >5 minutes in the UDS suite, he verbalized desire to have  BM. Therefore, all catheters were removed and he was brought to private bathroom where he was able to void 400 mL into a hat.  -Final catheterized post-void residual was 380 mL.   -Mild increased EMG activity during voiding that correlates with abdominal straining. Low suspicion for DSD.   -Evidence for possible bladder outlet obstruction based on high detrusor pressure, low flow voiding pattern.   -Fluoroscopy otherwise reveals a smooth-walled bladder without  diverticuli or VUR. Bladder neck is closed during filling.    PROCEDURE:    1. Sterile urethral catheterization for measurement of residual urine volume.  2. Complex filling cystometrogram with measurement of bladder and rectal pressures.  3. Electromyography of the pelvic floor during urodynamics.  4. Fluoroscopic imaging of the bladder during urodynamics, at least 3 views.    5. Interpretation of urodynamics and flouroscopic imaging.      INDICATIONS FOR PROCEDURE:  Mr. Alcides Velazquez is a pleasant 38 year old male with neurogenic bladder secondary to Guillain-[place] syndrome with persistent urinary frequency/urgency despite bladder Botox injections in May 2018. At an office visit with Dr. Cadet on 8/6/18, he was instructed to perform CIC at least 6 times daily after voiding on his own to measure post-void residuals. If PVR's consistently decreased, he was instructed to decrease his frequency of catheterizations as well. Today, he reports that he is only catheterizing once every other day but does not feel to be emptying his bladder well on his own (see below for details). Prior UDS in March 2018 demonstrated frequent DO at bladder volumes > 250 mL, evidence for bladder outlet obstruction, but a wide open bladder neck with some funneling of contrast in the area of the very proximal bulbar urethra or sphincter. Repeat video urodynamic assessment is requested today by Dr. Cadet to better characterize Mr. Alcides Velazquez's voiding dysfunction.      VOIDING DIARY:  No formal voiding diary completed.  After last office visit in August, he was cathing 3 times per day. Voided volumes 200 mL, cathed volumes 300 mL. PVR's then decreased to 200 mL (voided 300 mL), so he decreased to cathing twice per day, eventually once per day, and now once every other day. Not currently recording volumes.   He does not feel to be emptying his bladder when he voids on his own.     DESCRIPTION OF PROCEDURE:  Risks, benefits, and  alternatives to urodynamics were discussed with the patient and he wished to proceed.  Urodynamics are planned to better assess the primary etiology for Mr. Velazquez's urologic dysfunction.  The patient does not currently take anticholinergic medications for his bladder. He does take tamsulosin 0.4 mg once daily.  After informed consent was obtained, the patient was taken to the procedure room where uroflowmetry was performed. Findings below.     PRE-STUDY UROFLOWMETRY:  Not performed as patient had no urge to void.  Residual by catheter: 120 mL.  Pretest urine dipstick was negative for leukocytes and nitrites.    Next a 7F double-lumen urodynamics catheter was inserted into the bladder under sterile technique via urethra.  A 7F abdominal manometry catheter was placed in the rectum.  EMG pads were placed on both sides of the anal verge.  The bladder was filled with 200 mL of Cystografin at 25 mL/minute and serial pressures were recorded.  With coughing there was an appropriate rise in vesical and abdominal pressures with no change in detrusor pressure, confirming good study catheter placement.    DURING THE FILLING PHASE:  First sensation: 245 mL.  First Desire: 316 mL.  Strong Desire: 427 mL.  Maximum Capacity: 733 mL.    Uninhibited detrusor contractions: multiple episodes of DO without incontinence starting at bladder volumes >250 mL. Max Pdet reaches 43 cm H2O during filling.  Compliance: fair. PDet=17 cmH20 at capacity but this is artificially low due to elevated Pabd pressure. Pves at capacity is 64 cm H2O which is 47 cm H2O higher than starting Pves pressure of 17 cm H2O. Compliance ratio of 15.5 (733 mL / 47 cm H2O).  Continence: he never leaks to define DLPP. No HERMAN.  EMG: mostly concordant during filling.    DURING THE VOIDING PHASE:  Maximum detrusor contraction with void: >125 cm of H2O pressure.  Voided volume: few drops  Maximum flow rate: could not calculate due to low volume voided  Average flow rate:  could not calculate due to low volume voided  EMG activity: very mild increased EMG activity during voiding/attempt to void that correlates with abdominal straining.    After attempting to void for >5 minutes in the UDS suite with only drops, he was then brought to private bathroom where he voided on the regular toilet into a hat (done because he felt urge to have a BM).   Voided 400 mL into hat.  Catheterized for a final PVR of 380 mL.    FLUOROSCOPIC IMAGING OF THE BLADDER DURING URODYNAMICS:  Fluoroscopy during today's procedure demonstrated a smooth walled bladder without diverticulae or cellules.  No vesicoureteral reflux was observed.  The bladder neck was closed during filling and slightly open during attempt to void in the UDS suite, but could not capture great voiding images as he was only able to void a few drops.  At the completion of today's study, all catheters were removed, the patient was straight catheterized for residual volume, and was brought back into the consultation room to further discuss today's study results.      ASSESSMENT/PLAN:  Mr. Alcides Velazquez is a pleasant 38 year old male with neurogenic bladder secondary to Guillain-[place] syndrome with persistent urinary frequency/urgency despite bladder Botox injections in May 2018 who demonstrated the following findings today on urodynamic evaluation:    -Multiple episodes of detrusor overactvity without incontinence starting at bladder volumes >250 mL. Max Pdet reaches 43 cm H2O during filling.  -Fair bladder compliance - ratio 15.5 (PDet=17 cmH20 at capacity but this is artificially low due to elevated Pabd pressure at the end of filling. Pves at capacity is 64 cm H2O which is 47 cm H2O higher than starting Pves pressure of 17 cm H2O. Therefore, compliance ratio is 733 mL / 47 cm H2O = 15.5).  -MCFP 733 mL with normal filling sensations.  -He does not leak to define DLPP. No HERMAN.  -Repeated strong detrusor contractions to >125 cm H2O during  attempt to void in the UDS suite. However, he only voids a few drops.   -Bladder neck appears slightly open on fluoro during his attempt to void, but could not get great voiding images since he only voided a few drops.  -After attempting to void for >5 minutes in the UDS suite, he verbalized desire to have  BM. Therefore, all catheters were removed and he was brought to private bathroom where he was able to void 400 mL into a hat.  -Final catheterized post-void residual was 380 mL.   -Mild increased EMG activity during voiding that correlates with abdominal straining. Low suspicion for DSD.   -Evidence for possible bladder outlet obstruction based on high detrusor pressure, low flow voiding pattern.   -Fluoroscopy otherwise reveals a smooth-walled bladder without diverticuli or VUR. Bladder neck is closed during filling.    The patient will follow up as scheduled with Dr. Cadet to further discuss today's study results and make plans for how best to proceed.      - A single Cipro antibiotic was provided for UTI prophylaxis following completion of today's study per department protocol.  The risk of UTI with VUDS is low at ~2.5-3%.      Thank you for allowing me to participate in the care of Mr. Alcides Velazquez and please don't hesitate to contact me with any questions or concerns.      Lilliana Avalos PA-C  Urology Physician Assistant

## 2018-10-31 NOTE — PATIENT INSTRUCTIONS
Follow up with Dr. Cadet to discuss results and treatment options.    It was a pleasure meeting with you today.  Thank you for allowing me and my team the privilege of caring for you today.  YOU are the reason we are here, and I truly hope we provided you with the excellent service you deserve.  Please let us know if there is anything else we can do for you so that we can be sure you are leaving completely satisfied with your care experience.        BRIT Abrams

## 2018-10-31 NOTE — MR AVS SNAPSHOT
"              After Visit Summary   10/31/2018    Alcides Velazquez    MRN: [id number]           Patient Information     Date Of Birth          1980        Visit Information        Provider Department      10/31/2018 9:00 AM Lilliana Avalos PA-C [facility] and [facility] for Prostate and Urologic Cancers        Today's Diagnoses     Neurogenic bladder    -  1      Care Instructions    Follow up with Dr. Cadet to discuss results and treatment options.    It was a pleasure meeting with you today.  Thank you for allowing me and my team the privilege of caring for you today.  YOU are the reason we are here, and I truly hope we provided you with the excellent service you deserve.  Please let us know if there is anything else we can do for you so that we can be sure you are leaving completely satisfied with your care experience.        BRIT Abrams          Follow-ups after your visit        Your next 10 appointments already scheduled     Nov 05, 2018 10:00 AM CST   (Arrive by 9:45 AM)   Return Visit with Chito Cadet MD   [facility] and [facility] for Prostate and Urologic Cancers ([facility])    [address] [place] [id number] [phone]              Who to contact     Please call your clinic at [phone] to:    Ask questions about your health    Make or cancel appointments    Discuss your medicines    Learn about your test results    Speak to your doctor            Additional Information About Your Visit        Care EveryWhere ID     This is your Care EveryWhere ID. This could be used by other organizations to access your [place] medical records  [id number]        Your Vitals Were     Height BMI (Body Mass Index)                1.803 m (5' 11\") 33.47 kg/m2           Blood Pressure from Last 3 Encounters:   08/06/18 139/81   05/02/18 135/76   03/12/18 136/81    Weight from Last 3 Encounters:   10/31/18 108.9 kg (240 lb)   08/06/18 " 112.3 kg (247 lb 9.6 oz)   05/02/18 114.8 kg (253 lb)              We Performed the Following     Urinalysis chemical screen POCT          Today's Medication Changes          These changes are accurate as of 10/31/18 10:04 AM.  If you have any questions, ask your nurse or doctor.               These medicines have changed or have updated prescriptions.        Dose/Directions    ALPRAZolam 0.5 MG tablet   Commonly known as:  XANAX   This may have changed:  Another medication with the same name was removed. Continue taking this medication, and follow the directions you see here.   Changed by:  Lilliana Avalos PA-C        Dose:  0.5 mg   Take 0.5 mg by mouth daily as needed   Refills:  0       traMADol 50 MG tablet   Commonly known as:  ULTRAM   This may have changed:  Another medication with the same name was removed. Continue taking this medication, and follow the directions you see here.   Changed by:  Lilliana Avalos PA-C        TAKE ONE TO TWO TABLETS BY MOUTH AT BEDTIME AS NEEDED FOR PAIN *CAUTION: OPIOID. RISK OF OVERDOSE AND ADDICTION.   Refills:  0         Stop taking these medicines if you haven't already. Please contact your care team if you have questions.     melatonin 5 MG Caps   Stopped by:  Lilliana Avalos PA-C           mirabegron 25 MG 24 hr tablet   Commonly known as:  MYRBETRIQ   Stopped by:  Lilliana Avalos PA-C                    Primary Care Provider Office Phone # Fax #    Ismael Yan [phone]       [facility] [address]  [place] [address]        Equal Access to Services     [facility] AH: Hadii aad ku hadasho Soomaali, waaxda luqadaha, qaybta kaalmada adeegyada, waxay booker bautista . So [facility] [phone].    ATENCIÓN: Si habla español, tiene a cortes disposición servicios gratuitos de asistencia lingüística. Llame al [phone].    We comply with applicable federal civil rights laws and [place] laws. We do not  discriminate on the basis of race, color, national origin, age, disability, sex, sexual orientation, or gender identity.            Thank you!     Thank you for choosing [facility] UROLOGY AND [facility] FOR PROSTATE AND UROLOGIC CANCERS  for your care. Our goal is always to provide you with excellent care. Hearing back from our patients is one way we can continue to improve our services. Please take a few minutes to complete the written survey that you may receive in the mail after your visit with us. Thank you!             Your Updated Medication List - Protect others around you: Learn how to safely use, store and throw away your medicines at www.disposemymeds.org.          This list is accurate as of 10/31/18 10:04 AM.  Always use your most recent med list.                   Brand Name Dispense Instructions for use Diagnosis    acetaminophen 325 MG tablet    TYLENOL    25 tablet    Take 1-2 tablets (325-650 mg) by mouth every 6 hours as needed for other (mild pain)    Overactive bladder       ALPRAZolam 0.5 MG tablet    XANAX     Take 0.5 mg by mouth daily as needed        DULoxetine 30 MG EC capsule    CYMBALTA     Wait one week, then 30 mg po qd x1w, then 30 mg bid        FLUoxetine 40 MG capsule    PROzac     Take 40 mg by mouth daily        naproxen sodium 220 MG tablet    ANAPROX     Take 220 mg by mouth daily        NIFEdipine ER osmotic 30 MG 24 hr tablet    PROCARDIA XL     Take 30 mg by mouth daily        polyethylene glycol powder    MIRALAX/GLYCOLAX     MIX 17 GRAMS (1 CAPFUL) IN LIQUID AND DRINK TWICE DAILY        tamsulosin 0.4 MG capsule    FLOMAX     Take 0.4 mg by mouth daily        traMADol 50 MG tablet    ULTRAM     TAKE ONE TO TWO TABLETS BY MOUTH AT BEDTIME AS NEEDED FOR PAIN *CAUTION: OPIOID. RISK OF OVERDOSE AND ADDICTION.

## 2018-10-31 NOTE — PROGRESS NOTES
PREPROCEDURE DIAGNOSES:    1. 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 Urinary frequency/urgency, urge incontinence despite bladder Botox injections in May 2018.  3. Incomplete bladder emptying, performing CIC once qod    POSTPROCEDURE DIAGNOSES:  -Multiple episodes of detrusor overactvity without incontinence starting at bladder volumes >250 mL. Max Pdet reaches 43 cm H2O during filling.  -Fair bladder compliance - ratio 15.5 (PDet=17 cmH20 at capacity but this is artificially low due to elevated Pabd pressure at the end of filling. Pves at capacity is 64 cm H2O which is 47 cm H2O higher than starting Pves pressure of 17 cm H2O. Therefore, compliance ratio is 733 mL / 47 cm H2O = 15.5).  - mL with normal filling sensations.  -He does not leak to define DLPP. No HERMAN.  -Repeated strong detrusor contractions to >125 cm H2O during attempt to void in the UDS suite. However, he only voids a few drops.   -Bladder neck appears slightly open on fluoro during his attempt to void, but could not get great voiding images since he only voided a few drops.  -After attempting to void for >5 minutes in the UDS suite, he verbalized desire to have  BM. Therefore, all catheters were removed and he was brought to private bathroom where he was able to void 400 mL into a hat.  -Final catheterized post-void residual was 380 mL.   -Mild increased EMG activity during voiding that correlates with abdominal straining. Low suspicion for DSD.   -Evidence for possible bladder outlet obstruction based on high detrusor pressure, low flow voiding pattern.   -Fluoroscopy otherwise reveals a smooth-walled bladder without diverticuli or VUR. Bladder neck is closed during filling.    PROCEDURE:    1. Sterile urethral catheterization for measurement of residual urine volume.  2. Complex filling cystometrogram with measurement of bladder and rectal pressures.  3. Electromyography of the pelvic floor during urodynamics.  4.  Fluoroscopic imaging of the bladder during urodynamics, at least 3 views.    5. Interpretation of urodynamics and flouroscopic imaging.      INDICATIONS FOR PROCEDURE:  Mr. Alcides Velazquez is a pleasant 38 year old male with neurogenic bladder secondary to Guillain-[place] syndrome with persistent urinary frequency/urgency despite bladder Botox injections in May 2018. At an office visit with Dr. Cadet on 8/6/18, he was instructed to perform CIC at least 6 times daily after voiding on his own to measure post-void residuals. If PVR's consistently decreased, he was instructed to decrease his frequency of catheterizations as well. Today, he reports that he is only catheterizing once every other day but does not feel to be emptying his bladder well on his own (see below for details). Prior UDS in March 2018 demonstrated frequent DO at bladder volumes > 250 mL, evidence for bladder outlet obstruction, but a wide open bladder neck with some funneling of contrast in the area of the very proximal bulbar urethra or sphincter. Repeat video urodynamic assessment is requested today by Dr. Cadet to better characterize Mr. Alcides Velazquez's voiding dysfunction.      VOIDING DIARY:  No formal voiding diary completed.  After last office visit in August, he was cathing 3 times per day. Voided volumes 200 mL, cathed volumes 300 mL. PVR's then decreased to 200 mL (voided 300 mL), so he decreased to cathing twice per day, eventually once per day, and now once every other day. Not currently recording volumes.   He does not feel to be emptying his bladder when he voids on his own.     DESCRIPTION OF PROCEDURE:  Risks, benefits, and alternatives to urodynamics were discussed with the patient and he wished to proceed.  Urodynamics are planned to better assess the primary etiology for Mr. Velazquez's urologic dysfunction.  The patient does not currently take anticholinergic medications for his bladder. He does take tamsulosin 0.4 mg once daily.   After informed consent was obtained, the patient was taken to the procedure room where uroflowmetry was performed. Findings below.     PRE-STUDY UROFLOWMETRY:  Not performed as patient had no urge to void.  Residual by catheter: 120 mL.  Pretest urine dipstick was negative for leukocytes and nitrites.    Next a 7F double-lumen urodynamics catheter was inserted into the bladder under sterile technique via urethra.  A 7F abdominal manometry catheter was placed in the rectum.  EMG pads were placed on both sides of the anal verge.  The bladder was filled with 200 mL of Cystografin at 25 mL/minute and serial pressures were recorded.  With coughing there was an appropriate rise in vesical and abdominal pressures with no change in detrusor pressure, confirming good study catheter placement.    DURING THE FILLING PHASE:  First sensation: 245 mL.  First Desire: 316 mL.  Strong Desire: 427 mL.  Maximum Capacity: 733 mL.    Uninhibited detrusor contractions: multiple episodes of DO without incontinence starting at bladder volumes >250 mL. Max Pdet reaches 43 cm H2O during filling.  Compliance: fair. PDet=17 cmH20 at capacity but this is artificially low due to elevated Pabd pressure. Pves at capacity is 64 cm H2O which is 47 cm H2O higher than starting Pves pressure of 17 cm H2O. Compliance ratio of 15.5 (733 mL / 47 cm H2O).  Continence: he never leaks to define DLPP. No HERMAN.  EMG: mostly concordant during filling.    DURING THE VOIDING PHASE:  Maximum detrusor contraction with void: >125 cm of H2O pressure.  Voided volume: few drops  Maximum flow rate: could not calculate due to low volume voided  Average flow rate: could not calculate due to low volume voided  EMG activity: very mild increased EMG activity during voiding/attempt to void that correlates with abdominal straining.    After attempting to void for >5 minutes in the UDS suite with only drops, he was then brought to private bathroom where he voided on the regular  toilet into a hat (done because he felt urge to have a BM).   Voided 400 mL into hat.  Catheterized for a final PVR of 380 mL.    FLUOROSCOPIC IMAGING OF THE BLADDER DURING URODYNAMICS:  Fluoroscopy during today's procedure demonstrated a smooth walled bladder without diverticulae or cellules.  No vesicoureteral reflux was observed.  The bladder neck was closed during filling and slightly open during attempt to void in the UDS suite, but could not capture great voiding images as he was only able to void a few drops.  At the completion of today's study, all catheters were removed, the patient was straight catheterized for residual volume, and was brought back into the consultation room to further discuss today's study results.      ASSESSMENT/PLAN:  Mr. Alcides Velazquez is a pleasant 38 year old male with neurogenic bladder secondary to Guillain-[place] syndrome with persistent urinary frequency/urgency despite bladder Botox injections in May 2018 who demonstrated the following findings today on urodynamic evaluation:    -Multiple episodes of detrusor overactvity without incontinence starting at bladder volumes >250 mL. Max Pdet reaches 43 cm H2O during filling.  -Fair bladder compliance - ratio 15.5 (PDet=17 cmH20 at capacity but this is artificially low due to elevated Pabd pressure at the end of filling. Pves at capacity is 64 cm H2O which is 47 cm H2O higher than starting Pves pressure of 17 cm H2O. Therefore, compliance ratio is 733 mL / 47 cm H2O = 15.5).  -MCFP 733 mL with normal filling sensations.  -He does not leak to define DLPP. No HERMAN.  -Repeated strong detrusor contractions to >125 cm H2O during attempt to void in the UDS suite. However, he only voids a few drops.   -Bladder neck appears slightly open on fluoro during his attempt to void, but could not get great voiding images since he only voided a few drops.  -After attempting to void for >5 minutes in the UDS suite, he verbalized desire to have  BM.  Therefore, all catheters were removed and he was brought to private bathroom where he was able to void 400 mL into a hat.  -Final catheterized post-void residual was 380 mL.   -Mild increased EMG activity during voiding that correlates with abdominal straining. Low suspicion for DSD.   -Evidence for possible bladder outlet obstruction based on high detrusor pressure, low flow voiding pattern.   -Fluoroscopy otherwise reveals a smooth-walled bladder without diverticuli or VUR. Bladder neck is closed during filling.    The patient will follow up as scheduled with Dr. Cadet to further discuss today's study results and make plans for how best to proceed.      - A single Cipro antibiotic was provided for UTI prophylaxis following completion of today's study per department protocol.  The risk of UTI with VUDS is low at ~2.5-3%.      Thank you for allowing me to participate in the care of . Alcides Velazquez and please don't hesitate to contact me with any questions or concerns.      Lilliana Avalos PA-C  Urology Physician Assistant

## 2018-11-05 ENCOUNTER — OFFICE VISIT (OUTPATIENT)
Dept: UROLOGY | Facility: CLINIC | Age: 38
End: 2018-11-05
Payer: COMMERCIAL

## 2018-11-05 VITALS
BODY MASS INDEX: 33.47 KG/M2 | RESPIRATION RATE: 18 BRPM | SYSTOLIC BLOOD PRESSURE: 136 MMHG | WEIGHT: 240 LBS | DIASTOLIC BLOOD PRESSURE: 86 MMHG | HEART RATE: 100 BPM

## 2018-11-05 DIAGNOSIS — N31.9 NEUROGENIC BLADDER: Primary | ICD-10-CM

## 2018-11-05 ASSESSMENT — PAIN SCALES - GENERAL: PAINLEVEL: MILD PAIN (3)

## 2018-11-05 NOTE — MR AVS SNAPSHOT
After Visit Summary   11/5/2018    Alcides Velazquez    MRN: [id number]           Patient Information     Date Of Birth          1980        Visit Information        Provider Department      11/5/2018 10:00 AM Chito Cadet MD [facility] and [facility]        Care Instructions    Catheterize 6 times a day.    Schedule Urodynamics in a year.    It was a pleasure meeting with you today.  Thank you for allowing me and my team the privilege of caring for you today.  YOU are the reason we are here, and I truly hope we provided you with the excellent service you deserve.  Please let us know if there is anything else we can do for you so that we can be sure you are leaving completely satisfied with your care experience.                      Follow-ups after your visit        Who to contact     Please call your clinic at [phone] to:    Ask questions about your health    Make or cancel appointments    Discuss your medicines    Learn about your test results    Speak to your doctor            Additional Information About Your Visit        Care EveryWhere ID     This is your Care EveryWhere ID. This could be used by other organizations to access your [place] medical records  [id number]        Your Vitals Were     Pulse Respirations BMI (Body Mass Index)             100 18 33.47 kg/m2          Blood Pressure from Last 3 Encounters:   11/05/18 136/86   08/06/18 139/81   05/02/18 135/76    Weight from Last 3 Encounters:   11/05/18 108.9 kg (240 lb)   10/31/18 108.9 kg (240 lb)   08/06/18 112.3 kg (247 lb 9.6 oz)              Today, you had the following     No orders found for display       Primary Care Provider Office Phone # Fax #    Ismael Yan [phone]       [facility] [address] [address]        Equal Access to Services     KRYSTAL GOULD AH: daniel Schwartz qaybta kaalmada adeegyada,  selwyn machucaanna dean'aan ah. So [facility] [phone].    ATENCIÓN: Si rayola tate, tiene a cortes disposición servicios gratuitos de asistencia lingüística. Zeke schmidt [phone].    We comply with applicable federal civil rights laws and [place] laws. We do not discriminate on the basis of race, color, national origin, age, disability, sex, sexual orientation, or gender identity.            Thank you!     Thank you for choosing [facility] UROLOGY AND [facility]  for your care. Our goal is always to provide you with excellent care. Hearing back from our patients is one way we can continue to improve our services. Please take a few minutes to complete the written survey that you may receive in the mail after your visit with us. Thank you!             Your Updated Medication List - Protect others around you: Learn how to safely use, store and throw away your medicines at www.disposemymeds.org.          This list is accurate as of 11/5/18 11:33 AM.  Always use your most recent med list.                   Brand Name Dispense Instructions for use Diagnosis    acetaminophen 325 MG tablet    TYLENOL    25 tablet    Take 1-2 tablets (325-650 mg) by mouth every 6 hours as needed for other (mild pain)    Overactive bladder       ALPRAZolam 0.5 MG tablet    XANAX     Take 0.5 mg by mouth daily as needed        DULoxetine 30 MG EC capsule    CYMBALTA     Wait one week, then 30 mg po qd x1w, then 30 mg bid        FLUoxetine 40 MG capsule    PROzac     Take 40 mg by mouth daily        naproxen sodium 220 MG tablet    ANAPROX     Take 220 mg by mouth daily        NIFEdipine ER osmotic 30 MG 24 hr tablet    PROCARDIA XL     Take 30 mg by mouth daily        polyethylene glycol powder    MIRALAX/GLYCOLAX     MIX 17 GRAMS (1 CAPFUL) IN LIQUID AND DRINK TWICE DAILY        tamsulosin 0.4 MG capsule    FLOMAX     Take 0.4 mg by mouth daily        traMADol 50 MG tablet    ULTRAM     TAKE ONE TO TWO  TABLETS BY MOUTH AT BEDTIME AS NEEDED FOR PAIN *CAUTION: OPIOID. RISK OF OVERDOSE AND ADDICTION.

## 2018-11-05 NOTE — NURSING NOTE
Chief Complaint   Patient presents with     Results     Patient is here to follow up with UDS results     Cindy Grigsby CMA 9:46 AM on 11/5/2018.

## 2018-11-05 NOTE — PROGRESS NOTES
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 He was subsequently found to have a bulbar urethral stricture (managed by internal urethrotomy 08/2017) and bladder neck obstruction (managed by transurethral incision of bladder neck 10/2017). He is not wheelchair-bound.      Since last visit he was started on CIC which he has done for few weeks usng 14 Fr catheter. He was able to do that without any difficulty. He has a chart with him today showing that he voids around 100 ml then catheterizes around 250-300cc. He eventually stopped doing CIC and now he only would use it every other day to empty his bladder completely. He still has urgency and frequency (goes around 10-15 times during the day) and around 3 times overnight.   He feels the botox has made the urgency worse last time as it relaxed the bladder too much where he wasn't able to void, but off note he wasn't doing CIC at that time.                  Medications:     Current Outpatient Prescriptions   Medication Sig     acetaminophen (TYLENOL) 325 MG tablet Take 1-2 tablets (325-650 mg) by mouth every 6 hours as needed for other (mild pain)     ALPRAZolam (XANAX) 0.5 MG tablet Take 0.5 mg by mouth daily as needed     DULoxetine (CYMBALTA) 30 MG EC capsule Wait one week, then 30 mg po qd x1w, then 30 mg bid     FLUoxetine (PROZAC) 40 MG capsule Take 40 mg by mouth daily     naproxen sodium (ANAPROX) 220 MG tablet Take 220 mg by mouth daily     NIFEdipine ER osmotic (PROCARDIA XL) 30 MG 24 hr tablet Take 30  "mg by mouth daily     polyethylene glycol (MIRALAX/GLYCOLAX) powder MIX 17 GRAMS (1 CAPFUL) IN LIQUID AND DRINK TWICE DAILY     tamsulosin (FLOMAX) 0.4 MG capsule Take 0.4 mg by mouth daily     traMADol (ULTRAM) 50 MG tablet TAKE ONE TO TWO TABLETS BY MOUTH AT BEDTIME AS NEEDED FOR PAIN *CAUTION: OPIOID. RISK OF OVERDOSE AND ADDICTION.     No current facility-administered medications for this visit.              Review of Systems:    ROS: 14 point ROS neg other than the symptoms noted above in the HPI and PMH.          Physical Exam:   B/P: 136/86, T: Data Unavailable, P: 100, R: 18  Estimated body mass index is 33.47 kg/(m^2) as calculated from the following:    Height as of 10/31/18: 1.803 m (5' 11\").    Weight as of this encounter: 108.9 kg (240 lb).  General: age-appropriate appearing male in NAD sitting in an exam chair.    HEENT: Head AT/NC, EOMI, CN Grossly intact.  Resp: no respiratory distress  CV: heart rate regular         Data:      UDS 10/31  Multiple episodes of detrusor overactvity without incontinence starting at bladder volumes >250 mL. Max Pdet reaches 43 cm H2O during filling.  -Fair bladder compliance - ratio 15.5 (PDet=17 cmH20 at capacity but this is artificially low due to elevated Pabd pressure at the end of filling. Pves at capacity is 64 cm H2O which is 47 cm H2O higher than starting Pves pressure of 17 cm H2O. Therefore, compliance ratio is 733 mL / 47 cm H2O = 15.5).  -[facility] 733 mL with normal filling sensations.  -He does not leak to define DLPP. No HERMAN.  -Repeated strong detrusor contractions to >125 cm H2O during attempt to void in the UDS suite. However, he only voids a few drops.   -Bladder neck appears slightly open on fluoro during his attempt to void, but could not get great voiding images since he only voided a few drops.  -After attempting to void for >5 minutes in the UDS suite, he verbalized desire to have  BM. Therefore, all catheters were removed and he was brought to private " bathroom where he was able to void 400 mL into a hat.  -Final catheterized post-void residual was 380 mL.   -Mild increased EMG activity during voiding that correlates with abdominal straining. Low suspicion for DSD.   -Evidence for possible bladder outlet obstruction based on high detrusor pressure, low flow voiding pattern.   -Fluoroscopy otherwise reveals a smooth-walled bladder without diverticuli or VUR. Bladder neck is closed during filling.           Assessment and Plan:   38 year old male with neurogenic bladder secondary to GBS.   He is having issues with frequency, urgency and incomplete emptying.  On UDS, he wasn't able to have sustained bladder contraction when he was given the permission to void suggesting acontractile bladder. His urethra and bladder neck are open given the fact that he can pass a catheter with no difficulty (although bladder neck doesn't seem to open on video UDS).  He also has DO during filling phase that correlate with his urgency.      - Plan is to get back to consistent CIC to keep his bladder empty, maybe that would help with his urgency   - Will hold off repeat botox for now unless he doesn't improve with CIC   - Patient would like repeat UDS in 1 year to see if has regained some bladder function   - Patient will call for referral to Dr Shetty for consideration of interstim if wants to try that.   - I don't think another attempt at TUIBN would be worth it.       Patient was seen and examined with Dr. Helio Dave MD  Urology PGY4     =======================================================  As the attending surgeon I, Chito Cadet, interviewed and examined the patient. The plan was developed between me and the patient. My findings and plan are as stated by the resident.    As the attending surgeon, I, Chito Cadet, spent 20 minutes with the patient with >50% in consultation and coordination of care.        Chito Cadet MD

## 2018-11-05 NOTE — PATIENT INSTRUCTIONS
Catheterize 6 times a day.    Schedule Urodynamics in a year.    It was a pleasure meeting with you today.  Thank you for allowing me and my team the privilege of caring for you today.  YOU are the reason we are here, and I truly hope we provided you with the excellent service you deserve.  Please let us know if there is anything else we can do for you so that we can be sure you are leaving completely satisfied with your care experience.

## 2018-11-05 NOTE — LETTER
11/5/2018     RE: Alcides Velazquez  [address]  [place] [address]     Dear Colleague,    Thank you for referring your patient, Alcides Velazquez, to the [facility] UROLOGY AND INST FOR PROSTATE AND UROLOGIC CANCERS at [facility]. Please see a copy of my visit note below.    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 He was subsequently found to have a bulbar urethral stricture (managed by internal urethrotomy 08/2017) and bladder neck obstruction (managed by transurethral incision of bladder neck 10/2017). He is not wheelchair-bound.      Since last visit he was started on CIC which he has done for few weeks usng 14 Fr catheter. He was able to do that without any difficulty. He has a chart with him today showing that he voids around 100 ml then catheterizes around 250-300cc. He eventually stopped doing CIC and now he only would use it every other day to empty his bladder completely. He still has urgency and frequency (goes around 10-15 times during the day) and around 3 times overnight.   He feels the botox has made the urgency worse last time as it relaxed the bladder too much where he wasn't able to void, but off note he wasn't doing CIC at that time.                  Medications:     Current Outpatient Prescriptions   Medication Sig     acetaminophen (TYLENOL) 325 MG tablet Take 1-2 tablets (325-650 mg) by mouth every 6 hours as needed for other (mild pain)     ALPRAZolam (XANAX) 0.5 MG tablet Take 0.5 mg by mouth daily as needed      "DULoxetine (CYMBALTA) 30 MG EC capsule Wait one week, then 30 mg po qd x1w, then 30 mg bid     FLUoxetine (PROZAC) 40 MG capsule Take 40 mg by mouth daily     naproxen sodium (ANAPROX) 220 MG tablet Take 220 mg by mouth daily     NIFEdipine ER osmotic (PROCARDIA XL) 30 MG 24 hr tablet Take 30 mg by mouth daily     polyethylene glycol (MIRALAX/GLYCOLAX) powder MIX 17 GRAMS (1 CAPFUL) IN LIQUID AND DRINK TWICE DAILY     tamsulosin (FLOMAX) 0.4 MG capsule Take 0.4 mg by mouth daily     traMADol (ULTRAM) 50 MG tablet TAKE ONE TO TWO TABLETS BY MOUTH AT BEDTIME AS NEEDED FOR PAIN *CAUTION: OPIOID. RISK OF OVERDOSE AND ADDICTION.     No current facility-administered medications for this visit.            Review of Systems:    ROS: 14 point ROS neg other than the symptoms noted above in the HPI and PMH.          Physical Exam:   B/P: 136/86, T: Data Unavailable, P: 100, R: 18  Estimated body mass index is 33.47 kg/(m^2) as calculated from the following:    Height as of 10/31/18: 1.803 m (5' 11\").    Weight as of this encounter: 108.9 kg (240 lb).  General: age-appropriate appearing male in NAD sitting in an exam chair.    HEENT: Head AT/NC, EOMI, CN Grossly intact.  Resp: no respiratory distress  CV: heart rate regular         Data:      UDS 10/31  Multiple episodes of detrusor overactvity without incontinence starting at bladder volumes >250 mL. Max Pdet reaches 43 cm H2O during filling.  -Fair bladder compliance - ratio 15.5 (PDet=17 cmH20 at capacity but this is artificially low due to elevated Pabd pressure at the end of filling. Pves at capacity is 64 cm H2O which is 47 cm H2O higher than starting Pves pressure of 17 cm H2O. Therefore, compliance ratio is 733 mL / 47 cm H2O = 15.5).  -shelter 733 mL with normal filling sensations.  -He does not leak to define DLPP. No HERMAN.  -Repeated strong detrusor contractions to >125 cm H2O during attempt to void in the UDS suite. However, he only voids a few drops.   -Bladder neck " appears slightly open on fluoro during his attempt to void, but could not get great voiding images since he only voided a few drops.  -After attempting to void for >5 minutes in the UDS suite, he verbalized desire to have  BM. Therefore, all catheters were removed and he was brought to private bathroom where he was able to void 400 mL into a hat.  -Final catheterized post-void residual was 380 mL.   -Mild increased EMG activity during voiding that correlates with abdominal straining. Low suspicion for DSD.   -Evidence for possible bladder outlet obstruction based on high detrusor pressure, low flow voiding pattern.   -Fluoroscopy otherwise reveals a smooth-walled bladder without diverticuli or VUR. Bladder neck is closed during filling.         Assessment and Plan:   38 year old male with neurogenic bladder secondary to GBS.   He is having issues with frequency, urgency and incomplete emptying.  On UDS, he wasn't able to have sustained bladder contraction when he was given the permission to void suggesting acontractile bladder. His urethra and bladder neck are open given the fact that he can pass a catheter with no difficulty (although bladder neck doesn't seem to open on video UDS).  He also has DO during filling phase that correlate with his urgency.      - Plan is to get back to consistent CIC to keep his bladder empty, maybe that would help with his urgency   - Will hold off repeat botox for now unless he doesn't improve with CIC   - Patient would like repeat UDS in 1 year to see if has regained some bladder function   - Patient will call for referral to Dr Shetty for consideration of interstim if wants to try that.   - I don't think another attempt at [facility] would be worth it.     Patient was seen and examined with Dr. Helio Dave MD  Urology PGY4     =======================================================  As the attending surgeon I, Chito Cadet, interviewed and examined the patient. The plan was  developed between me and the patient. My findings and plan are as stated by the resident.    As the attending surgeon, I, Chito Cadet, spent 20 minutes with the patient with >50% in consultation and coordination of care.        Chito Cadet MD

## 2021-12-10 ENCOUNTER — TRANSCRIBE ORDERS (OUTPATIENT)
Dept: OTHER | Age: 41
End: 2021-12-10
Payer: COMMERCIAL

## 2021-12-10 DIAGNOSIS — R39.15 URINARY URGENCY: Primary | ICD-10-CM

## 2021-12-14 ENCOUNTER — PRE VISIT (OUTPATIENT)
Dept: UROLOGY | Facility: CLINIC | Age: 41
End: 2021-12-14
Payer: COMMERCIAL

## 2021-12-14 DIAGNOSIS — N31.9 NEUROGENIC BLADDER: Primary | ICD-10-CM

## 2021-12-14 NOTE — TELEPHONE ENCOUNTER
Reason for visit: Neurogenic bladder  Follow up     Relevant information: CIC    Records/imaging/labs/orders: orders placed for renal US per protocol    Pt called: no need for a call

## 2021-12-15 ENCOUNTER — TELEPHONE (OUTPATIENT)
Dept: UROLOGY | Facility: CLINIC | Age: 41
End: 2021-12-15
Payer: COMMERCIAL

## 2021-12-15 NOTE — TELEPHONE ENCOUNTER
Left a detailed VM that Dr Cadet ordered an Ultrasound to be done before visit on 12/20. I left number for radiology for [place] and left the clinic number. This is a video visit on Monday 7am

## 2021-12-15 NOTE — TELEPHONE ENCOUNTER
----- Message from Sangita Sims CMA sent at 12/14/2021  3:01 PM CST -----  Regarding: Urgent - imaging  Mahad needs imaging before he sees Dr. Cadet.    Please call to schedule.    Thank you,  Sangita

## 2021-12-17 ENCOUNTER — HOSPITAL ENCOUNTER (OUTPATIENT)
Dept: ULTRASOUND IMAGING | Facility: CLINIC | Age: 41
Discharge: HOME | End: 2021-12-17
Attending: UROLOGY | Admitting: UROLOGY
Payer: COMMERCIAL

## 2021-12-17 DIAGNOSIS — N31.9 NEUROGENIC BLADDER: ICD-10-CM

## 2021-12-17 PROCEDURE — 76770 US EXAM ABDO BACK WALL COMP: CPT

## 2022-02-03 ENCOUNTER — PRE VISIT (OUTPATIENT)
Dept: UROLOGY | Facility: CLINIC | Age: 42
End: 2022-02-03
Payer: COMMERCIAL

## 2022-04-04 ENCOUNTER — ANCILLARY PROCEDURE (OUTPATIENT)
Dept: RADIOLOGY | Facility: AMBULATORY SURGERY CENTER | Age: 42
End: 2022-04-04
Attending: NURSE PRACTITIONER
Payer: COMMERCIAL

## 2022-04-04 ENCOUNTER — ALLIED HEALTH/NURSE VISIT (OUTPATIENT)
Dept: UROLOGY | Facility: CLINIC | Age: 42
End: 2022-04-04
Payer: COMMERCIAL

## 2022-04-04 ENCOUNTER — PRE VISIT (OUTPATIENT)
Dept: UROLOGY | Facility: CLINIC | Age: 42
End: 2022-04-04

## 2022-04-04 VITALS
HEIGHT: 71 IN | BODY MASS INDEX: 32.9 KG/M2 | WEIGHT: 235 LBS | HEART RATE: 111 BPM | DIASTOLIC BLOOD PRESSURE: 91 MMHG | SYSTOLIC BLOOD PRESSURE: 178 MMHG

## 2022-04-04 DIAGNOSIS — R52 PAIN: ICD-10-CM

## 2022-04-04 DIAGNOSIS — R39.198 DIFFICULTY IN URINATION: Primary | ICD-10-CM

## 2022-04-04 DIAGNOSIS — N31.9 NEUROGENIC BLADDER: ICD-10-CM

## 2022-04-04 PROBLEM — Z79.899 CONTROLLED SUBSTANCE AGREEMENT SIGNED: Status: ACTIVE | Noted: 2020-08-09

## 2022-04-04 LAB
ALBUMIN UR-MCNC: NEGATIVE MG/DL
APPEARANCE UR: CLEAR
BILIRUB UR QL STRIP: NEGATIVE
COLOR UR AUTO: YELLOW
GLUCOSE UR STRIP-MCNC: NEGATIVE MG/DL
HGB UR QL STRIP: ABNORMAL
KETONES UR STRIP-MCNC: NEGATIVE MG/DL
LEUKOCYTE ESTERASE UR QL STRIP: NEGATIVE
NITRATE UR QL: NEGATIVE
PH UR STRIP: 7 [PH] (ref 5–8)
SP GR UR STRIP: 1.01 (ref 1–1.03)
UROBILINOGEN UR STRIP-ACNC: 0.2 E.U./DL

## 2022-04-04 PROCEDURE — 74455 X-RAY URETHRA/BLADDER: CPT | Performed by: NURSE PRACTITIONER

## 2022-04-04 PROCEDURE — 51741 ELECTRO-UROFLOWMETRY FIRST: CPT | Performed by: NURSE PRACTITIONER

## 2022-04-04 PROCEDURE — 51728 CYSTOMETROGRAM W/VP: CPT | Performed by: NURSE PRACTITIONER

## 2022-04-04 PROCEDURE — 51784 ANAL/URINARY MUSCLE STUDY: CPT | Performed by: NURSE PRACTITIONER

## 2022-04-04 PROCEDURE — 81003 URINALYSIS AUTO W/O SCOPE: CPT | Performed by: NURSE PRACTITIONER

## 2022-04-04 PROCEDURE — 51600 INJECTION FOR BLADDER X-RAY: CPT | Performed by: NURSE PRACTITIONER

## 2022-04-04 PROCEDURE — 51797 INTRAABDOMINAL PRESSURE TEST: CPT | Performed by: NURSE PRACTITIONER

## 2022-04-04 RX ORDER — SULFAMETHOXAZOLE/TRIMETHOPRIM 800-160 MG
1 TABLET ORAL ONCE
Status: COMPLETED | OUTPATIENT
Start: 2022-04-04 | End: 2022-04-04

## 2022-04-04 RX ORDER — ALBUTEROL SULFATE 90 UG/1
1-2 AEROSOL, METERED RESPIRATORY (INHALATION)
COMMUNITY
Start: 2022-02-11 | End: 2023-02-11

## 2022-04-04 RX ADMIN — SULFAMETHOXAZOLE AND TRIMETHOPRIM 1 TABLET: 800; 160 TABLET ORAL at 15:16

## 2022-04-04 ASSESSMENT — PAIN SCALES - GENERAL: PAINLEVEL: NO PAIN (0)

## 2022-04-04 NOTE — TELEPHONE ENCOUNTER
Reason for visit: Cystoscopy and review UDS    Relevant information: neurogenic bladder with difficult urination     Records/imaging/labs/orders: all appointments scheduled    Pt called: no need for a call    At Rooming: standard

## 2022-04-04 NOTE — PROGRESS NOTES
PREPROCEDURE DIAGNOSES:    1. Neurogenic bladder 2/2 Guillain-Little Neck syndrome, managed with CIC  2. History of bulbar urethral stricture s/p internal urethrotomy in 2017  3. UDS in 10/2018 showing multiple episodes of DO with no sustained bladder contraction, suggesting acontractile bladder.     POSTPROCEDURE DIAGNOSES:  -Small bladder capacity (291 mL instilled total, with small-volume leakage during DO episodes).  -Multiple episodes of DO starting at fill volume of ~230 mL, reaching Pdet pressure of 80 cm H20. He does not leak with the first two, but does leak with the remaining several. DLPP ~40 cm H2O.  -Fair/poor bladder compliance as his baseline Pdet pressure appears to rise gradually between episodes of DO, reaching ~30 cm H20 near the end of the study.   -Permission given to void during his last episode of DO, with Pdet pressure reaching ~90 cm of H2O pressure. His Pves catheter then becomes dislodged, so remaining Pdet pressure readings lost. Unclear how much of this contraction is volitional.   -Normal flow rate (Qmax 15 mL/s) with an intermittent flow curve and near-complete bladder emptying (final  mL).  -EMG quiet throughout the study.  -BOOI does not suggest bladder outlet obstruction.  -Fluoroscopy reveals a mildly-trabeculatd bladder wall without diverticulae or cellules.  No vesicoureteral reflux was observed.  The bladder neck appears open during filling and with voiding.     PROCEDURE:    -Uroflowmetry.  -Sterile urethral catheterization for measurement of postvoid residual urine volume.  -Complex filling cystometrogram with measurement of bladder and rectal pressures.  -Complex voiding cystometrogram with measurement of bladder and rectal pressures.  -Electromyography of the pelvic floor during urodynamics.  -Fluoroscopic imaging of the bladder during urodynamics, at least 3 views.    -Interpretation of urodynamics and flouroscopic imaging.      INDICATIONS FOR PROCEDURE:  Mr. Alcides GALEANO  Marcus is a pleasant 41 year old male with neurogenic bladder 2/2 Guillain-Hoyt syndrome, managed with CIC, as well as a history of bulbar urethral stricture s/p internal urethrotomy in 2017. UDS in 10/2018 showing multiple episodes of DO with no sustained bladder contraction, suggesting acontractile bladder. Video urodynamic assessment is requested today by Dr. Cadet to reevaluate the patient's bladder function.      VOIDING DIARY:  Not completed.     DESCRIPTION OF PROCEDURE:  Risks, benefits, and alternatives to urodynamics were discussed with the patient and he wished to proceed.  Urodynamics are planned to better assess the primary etiology for Mr. Velazquez's urologic dysfunction.  The patient does not currently take anticholinergic medication.  After informed consent was obtained, the patient was taken to the procedure room where the study was initiated. Findings below.     PRE-STUDY UROFLOWMETRY:  Voided volume: 308 mL.  Maximum flow rate: 15.2 mL/sec.  Average flow rate: 5.3 mL/sec.  Character of the curve: Fluctuating bell curve.  Postvoid residual by catheter: 140 mL.  Pretest urine dipstick was negative for leukocytes and nitrites.    Next a 7F double-lumen urodynamics catheter was inserted into the bladder under sterile technique via the urethra.  A 7F abdominal manometry catheter was placed in the rectum.  EMG pads were placed on both sides of the anal verge.  The bladder was filled with 200 mL of Omnipaque at 30 mL/minute and serial pressures were recorded.  With coughing there was an appropriate rise in vesical and abdominal pressures with no change in detrusor pressure, confirming good study catheter placement.    DURING THE FILLING PHASE:  First sensation: 233 mL.    Uninhibited detrusor contractions: Multiple episodes of DO starting at fill volume of ~230 mL, reaching Pdet pressure of 80 cm H20. He does not leak with the first two, but does leak with the remaining several.   Compliance: Fair/poor.  Baseline Pdet pressure appears to rise gradually between episodes of DO, reaching ~30 cm H20 near the end of the study.  Continence: Several leaks during DO episodes, as described about. DLPP ~40 cm H20.   EMG: Concordant during filling.    DURING THE VOIDING PHASE:  Maximum detrusor contraction with void: Permission given to void during his last episode of DO, with Pdet pressure reaching ~90 cm of H2O pressure. His Pves catheter then becomes dislodged, so remaining Pdet pressure readings lost.   Voided volume: 158 mL.  Maximum flow rate: 15 mL/sec.  Average flow rate: 5.3 mL/sec.  Postvoid Residual: 122 mL.  EMG activity: Quiet.  Character of voiding curve: Intermittent.  BOOI: -48.1 (suggesting no obstruction - see key below)  [obstructed (KOENIG index [BOOI] ? 40); equivocal (no definite   obstruction; BOOI 20-40); and no obstruction (BOOI ? 20)]    FLUOROSCOPIC IMAGING OF THE BLADDER DURING URODYNAMICS:  Please note, image numbers on UDS tracings correlate with iSite series numbers on PACS images. Fluoroscopy during today's procedure demonstrated a mildly-trabeculatd bladder wall without diverticulae or cellules.  No vesicoureteral reflux was observed.  The bladder neck appears open during filling and with voiding.  At the completion of the study, all catheters were removed and the patient was brought back into the consultation room to further discuss today's study results.      ASSESSMENT/PLAN:  Mr. Alcides Velazquez is a pleasant 41 year old male with neurogenic bladder 2/2 Guillain-[place] syndrome, managed with CIC, as well as a history of bulbar urethral stricture s/p internal urethrotomy in 2017. UDS in 10/2018 showing multiple episodes of DO with no sustained bladder contraction, suggesting acontractile bladder. who demonstrated the following findings today on urodynamic evaluation:    -Small bladder capacity (291 mL instilled total, with small-volume leakage during DO episodes).  -Multiple episodes of DO starting  at fill volume of ~230 mL, reaching Pdet pressure of 80 cm H20. He does not leak with the first two, but does leak with the remaining several. DLPP ~40 cm H2O.  -Fair/poor bladder compliance as his baseline Pdet pressure appears to rise gradually between episodes of DO, reaching ~30 cm H20 near the end of the study.   -Permission given to void during his last episode of DO, with Pdet pressure reaching ~90 cm of H2O pressure. His Pves catheter then becomes dislodged, so remaining Pdet pressure readings lost. Unclear how much of this contraction is volitional.   -Normal flow rate (Qmax 15 mL/s) with an intermittent flow curve and near-complete bladder emptying (final  mL).  -EMG quiet throughout the study.  -BOOI does not suggest bladder outlet obstruction.  -Fluoroscopy reveals a mildly-trabeculatd bladder wall without diverticulae or cellules.  No vesicoureteral reflux was observed.  The bladder neck appears open during filling and with voiding.     The patient will follow up as scheduled with Dr. Cadet to further discuss today's study results and make plans for how best to proceed.      - A single Bactrim DS was provided for UTI prophylaxis following completion of today's study per department protocol. The risk of UTI with VUDS is low at ~2.5-3%.      Thank you for allowing me to participate in the care of Mr. Alcides Velazquez and please don't hesitate to contact me with any questions or concerns.      This procedure was performed under a collaborative agreement with Dr. Chito Cadet, Professor and  of Urology, [facility].    EDDI Pa, CNP  Department of Urology

## 2022-04-04 NOTE — NURSING NOTE
"  Chief Complaint   Patient presents with     Urodynamics Study     Neurogenic bladder       Blood pressure (!) 188/106, pulse 105, height 1.803 m (5' 11\"), weight 106.6 kg (235 lb). Body mass index is 32.78 kg/m .    Patient Active Problem List   Diagnosis     Anxiety     Choking     Constipation     Corn     Difficulty in urination     Dysphagia     Essential hypertension     Guillain Barré syndrome (H)     Obesity (BMI 30.0-34.9)     Right-sided low back pain with right-sided sciatica     Snoring     Syncope     Syncope and collapse     Retention of urine     Vasovagal syncope     Acute infective polyneuritis (H)     Neurogenic bladder     Acute pain of right shoulder     Foot pain, bilateral     Controlled substance agreement signed       Allergies   Allergen Reactions     Influenza Vaccines Other (See Comments)     Other reaction(s): Other, see comments  Possibly caused Guillian [place]  Guillain [place]     Duloxetine Other (See Comments)     \"Did not work\" Back to prozac.   \"Did not work\" Back to prozac.          Current Outpatient Medications   Medication Sig Dispense Refill     acetaminophen (TYLENOL) 325 MG tablet Take 1-2 tablets (325-650 mg) by mouth every 6 hours as needed for other (mild pain) 25 tablet 0     ALPRAZolam (XANAX) 0.5 MG tablet Take 0.5 mg by mouth daily as needed       FLUoxetine (PROZAC) 20 MG capsule TAKE ONE CAPSULE BY MOUTH ONCE DAILY TAKE WITH 40MG CAPS TO EQUAL 60MG TOTAL       FLUoxetine (PROZAC) 40 MG capsule Take 40 mg by mouth daily       naproxen sodium (ANAPROX) 220 MG tablet Take 220 mg by mouth daily       NIFEdipine ER osmotic (PROCARDIA XL) 30 MG 24 hr tablet Take 30 mg by mouth daily       polyethylene glycol (MIRALAX/GLYCOLAX) powder MIX 17 GRAMS (1 CAPFUL) IN LIQUID AND DRINK TWICE DAILY       sildenafil (VIAGRA) 100 MG tablet 1/2 to 1 pill qd prn ED.       albuterol (PROAIR HFA/PROVENTIL HFA/VENTOLIN HFA) 108 (90 Base) MCG/ACT inhaler Inhale 1-2 puffs into the lungs " (Patient not taking: Reported on 2022)       DULoxetine (CYMBALTA) 30 MG EC capsule Wait one week, then 30 mg po qd x1w, then 30 mg bid (Patient not taking: Reported on 2022)       tamsulosin (FLOMAX) 0.4 MG capsule Take 0.4 mg by mouth daily (Patient not taking: Reported on 2021)       traMADol (ULTRAM) 50 MG tablet TAKE ONE TO TWO TABLETS BY MOUTH AT BEDTIME AS NEEDED FOR PAIN *CAUTION: OPIOID. RISK OF OVERDOSE AND ADDICTION. (Patient not taking: Reported on 2021)         Social History     Tobacco Use     Smoking status: Never Smoker     Smokeless tobacco: Former User     Types: Chew   Substance Use Topics     Alcohol use: Yes     Comment: Social     Drug use: No       Invasive Procedure Safety Checklist:    Procedure: Urodynamics    Action: Complete sections and checkboxes as appropriate.  Pre-procedure:  1. Patient ID Verified with 2 identifiers ([place] and  or MRN) : YES    2. Procedure and site verified with patient/designee (when able) : YES    3. Accurate consent documentation in medical record : YES    4. H&P (or appropriate assessment) documented in medical record : N/A  H&P must be up to 30 days prior to procedure an updated within 24 hours of Procedure as applicable.     5. Relevant diagnostic and radiology test results appropriately labeled and displayed as applicable : YES    6. Blood products, implants, devices, and/or special equipment available for the procedure as applicable : YES    7. Procedure site(s) marked with provider initials [Exclusions: none] : NO    8. Marking not required. Reason : Yes  Procedure does not require site marking    Time Out:     Time-Out performed immediately prior to starting procedure, including verbal and active participation of all team members addressing: YES    1. Correct patient identity.  2. Confirmed that the correct side and site are marked.  3. An accurate procedure to be done.  4. Agreement on the procedure to be done.  5. Correct patient  position.  6. Relevant images and results are properly labeled and appropriately displayed.  7. The need to administer antibiotics or fluids for irrigation purposes during the procedure as applicable.  8. Safety precautions based on patient history or medication use.    During Procedure: Verification of correct person, site, and procedure occurs any time the responsibility for care of the patient is transferred to another member of the care team.      The following medication was given:     MEDICATION:  Bactrim  ROUTE: PO  SITE: Orally  DOSE: 800/160mg  LOT #: [id number]  : [employer]  EXPIRATION DATE: 10/2023  NDC#: 6357-7462-96   Was there drug waste? No      The following medication was given:     MEDICATION:  Omnipaque (Iohexol Injection) (240mgI/mL)  ROUTE: Provider Administered  SITE: Provider Administered via catheter  DOSE: 200mL  LOT #: [id number]  : [employer]  EXPIRATION DATE: 4/8/2024  NDC#: [address]-89   Was there drug waste? No        Sharla Evans [facility]  4/4/2022  2:50 PM

## 2022-04-04 NOTE — PATIENT INSTRUCTIONS
UROLOGY CLINIC VISIT PATIENT INSTRUCTIONS    -Follow up with Dr. Cadet as scheduled.     If you have any issues, questions or concerns in the meantime, do not hesitate to contact us at [phone] or via [employer].     Toyin Bah, CNP  Department of Urology

## 2022-04-18 ENCOUNTER — OFFICE VISIT (OUTPATIENT)
Dept: UROLOGY | Facility: CLINIC | Age: 42
End: 2022-04-18
Payer: COMMERCIAL

## 2022-04-18 VITALS
HEIGHT: 71 IN | BODY MASS INDEX: 33.18 KG/M2 | DIASTOLIC BLOOD PRESSURE: 93 MMHG | HEART RATE: 91 BPM | WEIGHT: 237 LBS | SYSTOLIC BLOOD PRESSURE: 147 MMHG

## 2022-04-18 DIAGNOSIS — N31.9 NEUROGENIC BLADDER: Primary | ICD-10-CM

## 2022-04-18 PROCEDURE — 52000 CYSTOURETHROSCOPY: CPT | Performed by: UROLOGY

## 2022-04-18 PROCEDURE — 51741 ELECTRO-UROFLOWMETRY FIRST: CPT | Performed by: UROLOGY

## 2022-04-18 PROCEDURE — 51798 US URINE CAPACITY MEASURE: CPT | Performed by: UROLOGY

## 2022-04-18 RX ORDER — OXYBUTYNIN CHLORIDE 15 MG/1
15 TABLET, EXTENDED RELEASE ORAL DAILY
Qty: 30 TABLET | Refills: 11 | Status: SHIPPED | OUTPATIENT
Start: 2022-04-18 | End: 2022-05-18

## 2022-04-18 ASSESSMENT — PAIN SCALES - GENERAL: PAINLEVEL: NO PAIN (0)

## 2022-04-18 NOTE — PATIENT INSTRUCTIONS
"Take medication as directed.     Schedule an in-person visit with one of the APPs for follow up with a flow/pvr. Please come to this appointment with a full bladder.    It was a pleasure meeting with you today.  Thank you for allowing me and my team the privilege of caring for you today.  YOU are the reason we are here, and I truly hope we provided you with the excellent service you deserve.  Please let us know if there is anything else we can do for you so that we can be sure you are leaving completely satisfied with your care experience.        AFTER YOUR CYSTOSCOPY        You have just completed a cystoscopy, or \"cysto\", which allowed your physician to learn more about your bladder (or to remove a stent placed after surgery). We suggest that you continue to avoid caffeine, fruit juice, and alcohol for the next 24 hours, however, you are encouraged to return to your normal activities.         A few things that are considered normal after your cystoscopy:     * Small amount of bleeding (or spotting) that clears within the next 24 hours     * Slight burning sensation with urination     * Sensation to of needing to avoid more frequently     * The feeling of \"air\" in your urine     * Mild discomfort that is relieved with Tylenol        Please contact our office promptly if you:     * Develop a fever above 101 degrees     * Are unable to urinate     * Develop bright red blood that does not stop     * Severe pain or swelling         Please contact our office with any concerns or questions @DEPHN.  "

## 2022-04-18 NOTE — NURSING NOTE
"Chief Complaint   Patient presents with     Cystoscopy     Neurogenic bladder with difficulty bladder emptying and review UDS       Blood pressure (!) 147/93, pulse 91, height 1.803 m (5' 11\"), weight 107.5 kg (237 lb). Body mass index is 33.05 kg/m .    Patient Active Problem List   Diagnosis     Anxiety     Choking     Constipation     Corn     Difficulty in urination     Dysphagia     Essential hypertension     Guillain Barré syndrome (H)     Obesity (BMI 30.0-34.9)     Right-sided low back pain with right-sided sciatica     Snoring     Syncope     Syncope and collapse     Retention of urine     Vasovagal syncope     Acute infective polyneuritis (H)     Neurogenic bladder     Acute pain of right shoulder     Foot pain, bilateral     Controlled substance agreement signed       Allergies   Allergen Reactions     Influenza Vaccines Other (See Comments)     Other reaction(s): Other, see comments  Possibly caused Guillian [place]  Guillain [place]     Duloxetine Other (See Comments)     \"Did not work\" Back to prozac.   \"Did not work\" Back to prozac.          Current Outpatient Medications   Medication Sig Dispense Refill     acetaminophen (TYLENOL) 325 MG tablet Take 1-2 tablets (325-650 mg) by mouth every 6 hours as needed for other (mild pain) 25 tablet 0     ALPRAZolam (XANAX) 0.5 MG tablet Take 0.5 mg by mouth daily as needed       FLUoxetine (PROZAC) 20 MG capsule TAKE ONE CAPSULE BY MOUTH ONCE DAILY TAKE WITH 40MG CAPS TO EQUAL 60MG TOTAL       FLUoxetine (PROZAC) 40 MG capsule Take 40 mg by mouth daily       naproxen sodium (ANAPROX) 220 MG tablet Take 220 mg by mouth daily       NIFEdipine ER osmotic (PROCARDIA XL) 30 MG 24 hr tablet Take 30 mg by mouth daily       polyethylene glycol (MIRALAX/GLYCOLAX) powder MIX 17 GRAMS (1 CAPFUL) IN LIQUID AND DRINK TWICE DAILY       sildenafil (VIAGRA) 100 MG tablet 1/2 to 1 pill qd prn ED.       albuterol (PROAIR HFA/PROVENTIL HFA/VENTOLIN HFA) 108 (90 Base) MCG/ACT inhaler " Inhale 1-2 puffs into the lungs (Patient not taking: No sig reported)       DULoxetine (CYMBALTA) 30 MG EC capsule Wait one week, then 30 mg po qd x1w, then 30 mg bid (Patient not taking: No sig reported)       tamsulosin (FLOMAX) 0.4 MG capsule Take 0.4 mg by mouth daily (Patient not taking: No sig reported)       traMADol (ULTRAM) 50 MG tablet TAKE ONE TO TWO TABLETS BY MOUTH AT BEDTIME AS NEEDED FOR PAIN *CAUTION: OPIOID. RISK OF OVERDOSE AND ADDICTION. (Patient not taking: No sig reported)         Social History     Tobacco Use     Smoking status: Never Smoker     Smokeless tobacco: Former User     Types: Chew   Substance Use Topics     Alcohol use: Yes     Comment: Social     Drug use: No       Sangita Sims CMA  2022  1:40 PM     Invasive Procedure Safety Checklist:    Procedure: Cystoscopy     Action: Complete sections and checkboxes as appropriate.    Pre-procedure:  1. Patient ID Verified with 2 identifiers ([place] and  or MRN) : YES    2. Procedure and site verified with patient/designee (when able) : YES    3. Accurate consent documentation in medical record : YES    4. H&P (or appropriate assessment) documented in medical record : YES  H&P must be up to 30 days prior to procedure an updated within 24 hours of                 Procedure as applicable.     5. Relevant diagnostic and radiology test results appropriately labeled and displayed as applicable : YES    6. Blood products, implants, devices, and/or special equipment available for the procedure as applicable : YES    7. Procedure site(s) marked with provider initials [Exclusions: None] : NO    8. Marking not required. Reason : Yes  Procedure does not require site marking    Time Out:     Time-Out performed immediately prior to starting procedure, including verbal and active participation of all team members addressing: YES    1. Correct patient identity.  2. Confirmed that the correct side and site are marked.  3. An accurate procedure to  be done.  4. Agreement on the procedure to be done.  5. Correct patient position.  6. Relevant images and results are properly labeled and appropriately displayed.  7. The need to administer antibiotics or fluids for irrigation purposes during the procedure as applicable.  8. Safety precautions based on patient history or medication use.    During Procedure: Verification of correct person, site, and procedure occurs any time the responsibility for care of the patient is transferred to another member of the care team.    No medications administered during this procedure.    Sangita Sims CMA  April 18, 2022

## 2022-04-18 NOTE — PROGRESS NOTES
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 He had BoTox 05/2018 (200 units) for irritative LUTS and on post-BoTox UDS 10/2018 was found to have DO with no sustained bladder contractions suggestive of acontractile bladder. This was repeated on 4/4/22 which showed small capacity (291 mL), frequnt DO, poor compliance, normal flow and PVR of 122    He presents today for diagnostic cystoscopy and to discuss repeat UDS findings.    Today, he notes he voids on his own and has not used CIC for the last few years. Wakes up several times at night and has frequency/urgency throughout the day. Able to void on his own but feels he does not empty fully. Takes no bladder meds. Opposed to CIC and would like to avoid this as possible.     Cr 4/2/21 0.91    REVIEW OF OFFICE STUDIES:   UDS was repeated 4/4/22:    -Small bladder capacity (291 mL instilled total, with small-volume leakage during DO episodes).  -Multiple episodes of DO starting at fill volume of ~230 mL, reaching Pdet pressure of 80 cm H20. He does not leak with the first two, but does leak with the remaining several. DLPP ~40 cm H2O.  -Fair/poor bladder compliance as his baseline Pdet pressure appears to rise gradually between episodes of DO, reaching ~30 cm H20 near the end of the study.   -Permission given to void during his last episode of DO, with Pdet pressure reaching ~90 cm of H2O pressure. His Pves catheter then becomes dislodged, so remaining Pdet pressure readings lost. Unclear how much of this contraction is volitional.   -Normal flow rate (Qmax 15 mL/s) with an  intermittent flow curve and near-complete bladder emptying (final  mL).  -EMG quiet throughout the study.  -BOOI does not suggest bladder outlet obstruction.  -Fluoroscopy reveals a mildly-trabeculatd bladder wall without diverticulae or cellules.  No vesicoureteral reflux was observed.  The bladder neck appears open during filling and with voiding.       DESCRIPTION OF PROCEDURE:  After informed consent was obtained, the patient was brought to the procedure room where he was placed in the supine position with all pressure points well padded.  The penis and scrotum were prepped and draped in a sterile fashion. A flexible cystoscope was introduced through a well-lubricated urethra.  The anterior urethra was free of stricture. The urinary sphincter was intact. The prostate was nonobstructive. Bladder neck was open and notable for scarring at sites of prior bladder neck incisions. Bladder was moderately trabeculated but free of diverticuli, cellules, tumors or stones. The flexible cystoscope was removed and the findings were described to the patient.     ASSESSMENT AND PLAN:  41 year old man with NGB 2/2 GBS.     He has frequent DO, small capacity of 291 mL, fair/poor compliance, and incomplete emptying w/  on UDS from 4/4/22. He has bothersome irritative LUTS but does not want to be CIC-dependent again if possible. He is not on any bladder meds and we discussed risks/benefits of meds vs. another round of BoTox (may make CIC more likely as he needed this last time and already doesn't empty fully) vs. Sacral nerve stimulation were also discussed.    He would like to start with medications with symptom recheck in the near future.     Urinary Flow Rate  Peak Flow: 16.7 mL/s  Average Flow: 9.1 mL/s  Voided (mL): 319 mL  Residual Volume by Ultrasound: 25 mL      Plan:  - Will start ditropan XL 15 mg today  - f/u with [facility]  - If at his follow up his frequency/urgency are better and PVR <150 then continue  ditropan 15 and f/u prn. If urgency not better and PVR<150 then increase ditropan to 30 and repeat flow /PVR a month later. If urgency/frequency better but PVR>300 then he should start CIC. If urgency / freq not better on 30 ditropan then refer to [place] consider intersim or me for Botox (his choice).     =======================================================  As the attending surgeon I, Chito Cadet, interviewed and examined the patient. The plan was developed between me and the patient. My findings and plan are as stated by the resident.  As the attending surgeon I, Chito Cadet, was present and scrubbed throughout the procedure.    Chito Cadet MD

## 2022-04-18 NOTE — LETTER
4/18/2022       RE: Alcides Velazquez  [address]  [place] [address]     Dear Colleague,    Thank you for referring your patient, Alcides Velazquez, to the [facility] [place] at [facility]. Please see a copy of my visit note below.    Male Cystoscopy Procedure Note     PRE-PROCEDURE DIAGNOSIS: Neurogenic bladder, Guillain-Thicket syndrome  POST-PROCEDURE DIAGNOSIS: As above  PROCEDURE: cystoscopy    HISTORY: Alcides Velazquez is a 41 year old man with with neurogenic bladder 2/2 Guillain-Thicket syndrome, managed with CIC, as well as a history of bulbar urethral stricture s/p internal urethrotomy in 08/2017 as well as bladder neck obstruction seen on UDS, s/p TUIBN 10/2017. He had BoTox 05/2018 (200 units) for irritative LUTS and on post-BoTox UDS 10/2018 was found to have DO with no sustained bladder contractions suggestive of acontractile bladder. This was repeated on 4/4/22 which showed small capacity (291 mL), frequnt DO, poor compliance, normal flow and PVR of 122    He presents today for diagnostic cystoscopy and to discuss repeat UDS findings.    Today, he notes he voids on his own and has not used CIC for the last few years. Wakes up several times at night and has frequency/urgency throughout the day. Able to void on his own but feels he does not empty fully. Takes no bladder meds. Opposed to CIC and would like to avoid this as possible.     Cr 4/2/21 0.91    REVIEW OF OFFICE STUDIES:   UDS was repeated 4/4/22:    -Small bladder capacity (291 mL instilled total, with small-volume leakage during DO episodes).  -Multiple episodes of DO starting at fill volume of ~230 mL, reaching Pdet pressure of 80 cm H20. He does not leak with the first two, but does leak with the remaining several. DLPP ~40 cm H2O.  -Fair/poor bladder compliance as his baseline Pdet pressure appears to rise gradually between episodes of DO, reaching ~30 cm H20 near the end of the  study.   -Permission given to void during his last episode of DO, with Pdet pressure reaching ~90 cm of H2O pressure. His Pves catheter then becomes dislodged, so remaining Pdet pressure readings lost. Unclear how much of this contraction is volitional.   -Normal flow rate (Qmax 15 mL/s) with an intermittent flow curve and near-complete bladder emptying (final  mL).  -EMG quiet throughout the study.  -BOOI does not suggest bladder outlet obstruction.  -Fluoroscopy reveals a mildly-trabeculatd bladder wall without diverticulae or cellules.  No vesicoureteral reflux was observed.  The bladder neck appears open during filling and with voiding.       DESCRIPTION OF PROCEDURE:  After informed consent was obtained, the patient was brought to the procedure room where he was placed in the supine position with all pressure points well padded.  The penis and scrotum were prepped and draped in a sterile fashion. A flexible cystoscope was introduced through a well-lubricated urethra.  The anterior urethra was free of stricture. The urinary sphincter was intact. The prostate was nonobstructive. Bladder neck was open and notable for scarring at sites of prior bladder neck incisions. Bladder was moderately trabeculated but free of diverticuli, cellules, tumors or stones. The flexible cystoscope was removed and the findings were described to the patient.     ASSESSMENT AND PLAN:  41 year old man with NGB 2/2 GBS.     He has frequent DO, small capacity of 291 mL, fair/poor compliance, and incomplete emptying w/  on UDS from 4/4/22. He has bothersome irritative LUTS but does not want to be CIC-dependent again if possible. He is not on any bladder meds and we discussed risks/benefits of meds vs. another round of BoTox (may make CIC more likely as he needed this last time and already doesn't empty fully) vs. Sacral nerve stimulation were also discussed.    He would like to start with medications with symptom recheck in  the near future.     Urinary Flow Rate  Peak Flow: 16.7 mL/s  Average Flow: 9.1 mL/s  Voided (mL): 319 mL  Residual Volume by Ultrasound: 25 mL    Plan:  - Will start ditropan XL 15 mg today  - f/u with JAMMIE  - If at his follow up his frequency/urgency are better and PVR <150 then continue ditropan 15 and f/u prn. If urgency not better and PVR<150 then increase ditropan to 30 and repeat flow /PVR a month later. If urgency/frequency better but PVR>300 then he should start CIC. If urgency / freq not better on 30 ditropan then refer to [facility] consider intersim or me for Botox (his choice).     =======================================================  As the attending surgeon I, Chito Cadet, interviewed and examined the patient. The plan was developed between me and the patient. My findings and plan are as stated by the resident.  As the attending surgeon I, Chito Cadet, was present and scrubbed throughout the procedure.    Chito Cadet MD

## 2022-05-27 ENCOUNTER — OFFICE VISIT (OUTPATIENT)
Dept: UROLOGY | Facility: CLINIC | Age: 42
End: 2022-05-27
Payer: COMMERCIAL

## 2022-05-27 DIAGNOSIS — N31.9 NEUROGENIC BLADDER: Primary | ICD-10-CM

## 2022-05-27 PROCEDURE — 51798 US URINE CAPACITY MEASURE: CPT

## 2022-05-27 PROCEDURE — 51741 ELECTRO-UROFLOWMETRY FIRST: CPT

## 2022-05-27 NOTE — PROGRESS NOTES
"Chief Complaint   Patient presents with     Allied Health Visit       Patient Active Problem List   Diagnosis     Anxiety     Choking     Constipation     Corn     Difficulty in urination     Dysphagia     Essential hypertension     Guillain Barré syndrome (H)     Obesity (BMI 30.0-34.9)     Right-sided low back pain with right-sided sciatica     Snoring     Syncope     Syncope and collapse     Retention of urine     Vasovagal syncope     Acute infective polyneuritis (H)     Neurogenic bladder     Acute pain of right shoulder     Foot pain, bilateral     Controlled substance agreement signed       Allergies   Allergen Reactions     Influenza Vaccines Other (See Comments)     Other reaction(s): Other, see comments  Possibly caused Guillian [place]  Guillain [place]     Duloxetine Other (See Comments)     \"Did not work\" Back to prozac.   \"Did not work\" Back to prozac.          Current Outpatient Medications   Medication Sig Dispense Refill     acetaminophen (TYLENOL) 325 MG tablet Take 1-2 tablets (325-650 mg) by mouth every 6 hours as needed for other (mild pain) 25 tablet 0     albuterol (PROAIR HFA/PROVENTIL HFA/VENTOLIN HFA) 108 (90 Base) MCG/ACT inhaler Inhale 1-2 puffs into the lungs (Patient not taking: No sig reported)       ALPRAZolam (XANAX) 0.5 MG tablet Take 0.5 mg by mouth daily as needed       DULoxetine (CYMBALTA) 30 MG EC capsule Wait one week, then 30 mg po qd x1w, then 30 mg bid (Patient not taking: No sig reported)       FLUoxetine (PROZAC) 20 MG capsule TAKE ONE CAPSULE BY MOUTH ONCE DAILY TAKE WITH 40MG CAPS TO EQUAL 60MG TOTAL       FLUoxetine (PROZAC) 40 MG capsule Take 40 mg by mouth daily       naproxen sodium (ANAPROX) 220 MG tablet Take 220 mg by mouth daily       NIFEdipine ER osmotic (PROCARDIA XL) 30 MG 24 hr tablet Take 30 mg by mouth daily       polyethylene glycol (MIRALAX/GLYCOLAX) powder MIX 17 GRAMS (1 CAPFUL) IN LIQUID AND DRINK TWICE DAILY       sildenafil (VIAGRA) 100 MG tablet 1/2 " to 1 pill qd prn ED.       tamsulosin (FLOMAX) 0.4 MG capsule Take 0.4 mg by mouth daily (Patient not taking: No sig reported)       traMADol (ULTRAM) 50 MG tablet TAKE ONE TO TWO TABLETS BY MOUTH AT BEDTIME AS NEEDED FOR PAIN *CAUTION: OPIOID. RISK OF OVERDOSE AND ADDICTION. (Patient not taking: No sig reported)         Social History     Tobacco Use     Smoking status: Never Smoker     Smokeless tobacco: Former User     Types: Chew   Substance Use Topics     Alcohol use: Yes     Comment: Social     Drug use: No       Alcides Velazquez comes into clinic today at the request of Chito Cadet MD for Uroflow/PVR.    Patient diagnosis: Neurogenic bladder    Urinary Flow Rate:  Peak Flow: 15.5 mL/s  Average Flow: 4.7 mL/s  Voided (mL): 328mL  Residual Volume by Ultrasound: 77 mL  Character of Curve: Can not be characterized     The patient had some questions about a drug dosage increase of the medication  prescribed him. He is unsure what medication  prescribed him was but believes it is the oxybutynin. The patient informed me that the medication is working really well. The patient told me that he was warned by  that if he the dosage is too high that he could have the potential of having to  learn CIC. We agreed that if it is working well that he should stay with his dosage and if he would really like to change the dosage it he can MyChart message the clinic.    This service provided today was under the direct supervision of Dr. Carlos Lopez, who was available if needed.    Stacy Nelson EMT  5/27/2022  11:25 AM

## 2025-02-04 ENCOUNTER — MEDICAL CORRESPONDENCE (OUTPATIENT)
Dept: HEALTH INFORMATION MANAGEMENT | Facility: CLINIC | Age: 45
End: 2025-02-04

## 2025-02-05 ENCOUNTER — TRANSCRIBE ORDERS (OUTPATIENT)
Dept: OTHER | Age: 45
End: 2025-02-05

## 2025-02-05 DIAGNOSIS — N31.9 NEUROGENIC BLADDER: Primary | ICD-10-CM

## (undated) DEVICE — SYR 10ML LL W/O NDL

## (undated) DEVICE — PACK CYSTO CUSTOM ASC

## (undated) DEVICE — SOL NACL 0.9% IRRIG 500ML BOTTLE 2F7123

## (undated) DEVICE — SOL WATER IRRIG 1000ML BOTTLE 2F7114

## (undated) DEVICE — LINEN TOWEL PACK X5 5464

## (undated) DEVICE — SUCTION MANIFOLD NEPTUNE 2 SYS 1 PORT 702-025-000

## (undated) DEVICE — GLOVE PROTEXIS W/NEU-THERA 7.5  2D73TE75

## (undated) DEVICE — SOL NACL 0.9% IRRIG 1000ML BOTTLE 2F7124

## (undated) DEVICE — SOL NACL 0.9% IRRIG 3000ML BAG 2B7477

## (undated) DEVICE — SOL SORBITOL 3% IRRIG 3000ML BAG 2B7357

## (undated) DEVICE — NDL BLUNT 18GA 1" W/O FILTER 305181

## (undated) DEVICE — DRSG TELFA 3X8" 1238

## (undated) DEVICE — CATH PLUG W/CAP 000076

## (undated) DEVICE — GLOVE PROTEXIS W/NEU-THERA 8.0  2D73TE80

## (undated) DEVICE — SOL WATER IRRIG 500ML BOTTLE 2F7113

## (undated) DEVICE — LINEN GOWN XLG 5407

## (undated) DEVICE — SOL WATER IRRIG 3000ML BAG 2B7117

## (undated) DEVICE — GUIDEWIRE SENSOR DUAL FLEX STR 0.035"X150CM M0066703080

## (undated) DEVICE — SUCTION MANIFOLD NEPTUNE 2 SYS 4 PORT 0702-020-000

## (undated) DEVICE — ESU GROUND PAD ADULT W/CORD E7507

## (undated) DEVICE — DRAPE GYN/UROLOGY FLUID POUCH TUR 29455

## (undated) DEVICE — PAD CHUX UNDERPAD 30X30"

## (undated) DEVICE — CONNECTOR WATER VALVE PERFUSION PACK STR 020272801

## (undated) DEVICE — SOL NACL 0.9% 10ML VIAL 0409-4888-02

## (undated) DEVICE — SYR PISTON URETHRAL 60ML 68000

## (undated) DEVICE — ESU ELEC COAGULATE 24FR POINTED  27050L-S

## (undated) DEVICE — GOWN XLG DISP 9545

## (undated) DEVICE — SYR 30ML LL W/O NDL 302832

## (undated) DEVICE — EVACUATOR BLADDER UROVAC LATEX M0067301250

## (undated) DEVICE — NDL ASPIRATING INJECT 22GA 31.25CM

## (undated) RX ORDER — CIPROFLOXACIN 500 MG/1
TABLET, FILM COATED ORAL
Status: DISPENSED
Start: 2017-06-29

## (undated) RX ORDER — LORAZEPAM 0.5 MG/1
TABLET ORAL
Status: DISPENSED
Start: 2018-05-02

## (undated) RX ORDER — PROPOFOL 10 MG/ML
INJECTION, EMULSION INTRAVENOUS
Status: DISPENSED
Start: 2018-05-02

## (undated) RX ORDER — DEXAMETHASONE SODIUM PHOSPHATE 4 MG/ML
INJECTION, SOLUTION INTRA-ARTICULAR; INTRALESIONAL; INTRAMUSCULAR; INTRAVENOUS; SOFT TISSUE
Status: DISPENSED
Start: 2017-08-11

## (undated) RX ORDER — PROPOFOL 10 MG/ML
INJECTION, EMULSION INTRAVENOUS
Status: DISPENSED
Start: 2017-08-11

## (undated) RX ORDER — FENTANYL CITRATE 50 UG/ML
INJECTION, SOLUTION INTRAMUSCULAR; INTRAVENOUS
Status: DISPENSED
Start: 2018-05-02

## (undated) RX ORDER — DEXAMETHASONE SODIUM PHOSPHATE 4 MG/ML
INJECTION, SOLUTION INTRA-ARTICULAR; INTRALESIONAL; INTRAMUSCULAR; INTRAVENOUS; SOFT TISSUE
Status: DISPENSED
Start: 2018-05-02

## (undated) RX ORDER — FENTANYL CITRATE 50 UG/ML
INJECTION, SOLUTION INTRAMUSCULAR; INTRAVENOUS
Status: DISPENSED
Start: 2017-10-27

## (undated) RX ORDER — ACETAMINOPHEN 325 MG/1
TABLET ORAL
Status: DISPENSED
Start: 2017-08-11

## (undated) RX ORDER — GABAPENTIN 300 MG/1
CAPSULE ORAL
Status: DISPENSED
Start: 2017-10-27

## (undated) RX ORDER — CIPROFLOXACIN 500 MG/1
TABLET, FILM COATED ORAL
Status: DISPENSED
Start: 2018-10-31

## (undated) RX ORDER — FENTANYL CITRATE 50 UG/ML
INJECTION, SOLUTION INTRAMUSCULAR; INTRAVENOUS
Status: DISPENSED
Start: 2017-08-11

## (undated) RX ORDER — GABAPENTIN 300 MG/1
CAPSULE ORAL
Status: DISPENSED
Start: 2018-05-02

## (undated) RX ORDER — DEXAMETHASONE SODIUM PHOSPHATE 4 MG/ML
INJECTION, SOLUTION INTRA-ARTICULAR; INTRALESIONAL; INTRAMUSCULAR; INTRAVENOUS; SOFT TISSUE
Status: DISPENSED
Start: 2017-10-27

## (undated) RX ORDER — ACETAMINOPHEN 325 MG/1
TABLET ORAL
Status: DISPENSED
Start: 2018-05-02

## (undated) RX ORDER — PROPOFOL 10 MG/ML
INJECTION, EMULSION INTRAVENOUS
Status: DISPENSED
Start: 2017-10-27

## (undated) RX ORDER — ONDANSETRON 2 MG/ML
INJECTION INTRAMUSCULAR; INTRAVENOUS
Status: DISPENSED
Start: 2018-05-02

## (undated) RX ORDER — SULFAMETHOXAZOLE/TRIMETHOPRIM 800-160 MG
TABLET ORAL
Status: DISPENSED
Start: 2022-04-04

## (undated) RX ORDER — LIDOCAINE HYDROCHLORIDE 20 MG/ML
INJECTION, SOLUTION EPIDURAL; INFILTRATION; INTRACAUDAL; PERINEURAL
Status: DISPENSED
Start: 2018-05-02

## (undated) RX ORDER — CIPROFLOXACIN 500 MG/1
TABLET, FILM COATED ORAL
Status: DISPENSED
Start: 2018-03-08

## (undated) RX ORDER — LIDOCAINE HYDROCHLORIDE 20 MG/ML
INJECTION, SOLUTION EPIDURAL; INFILTRATION; INTRACAUDAL; PERINEURAL
Status: DISPENSED
Start: 2017-10-27

## (undated) RX ORDER — ONDANSETRON 2 MG/ML
INJECTION INTRAMUSCULAR; INTRAVENOUS
Status: DISPENSED
Start: 2017-10-27

## (undated) RX ORDER — ACETAMINOPHEN 325 MG/1
TABLET ORAL
Status: DISPENSED
Start: 2017-10-27

## (undated) RX ORDER — GABAPENTIN 300 MG/1
CAPSULE ORAL
Status: DISPENSED
Start: 2017-08-11

## (undated) RX ORDER — KETOROLAC TROMETHAMINE 30 MG/ML
INJECTION, SOLUTION INTRAMUSCULAR; INTRAVENOUS
Status: DISPENSED
Start: 2017-08-11

## (undated) RX ORDER — KETOROLAC TROMETHAMINE 30 MG/ML
INJECTION, SOLUTION INTRAMUSCULAR; INTRAVENOUS
Status: DISPENSED
Start: 2017-10-27

## (undated) RX ORDER — ONDANSETRON 2 MG/ML
INJECTION INTRAMUSCULAR; INTRAVENOUS
Status: DISPENSED
Start: 2017-08-11